# Patient Record
Sex: FEMALE | Race: WHITE
[De-identification: names, ages, dates, MRNs, and addresses within clinical notes are randomized per-mention and may not be internally consistent; named-entity substitution may affect disease eponyms.]

---

## 2021-04-05 ENCOUNTER — HOSPITAL ENCOUNTER (INPATIENT)
Dept: HOSPITAL 47 - EC | Age: 26
LOS: 1 days | Discharge: HOME | DRG: 176 | End: 2021-04-06
Attending: INTERNAL MEDICINE | Admitting: INTERNAL MEDICINE
Payer: COMMERCIAL

## 2021-04-05 VITALS — RESPIRATION RATE: 16 BRPM

## 2021-04-05 DIAGNOSIS — I26.99: Primary | ICD-10-CM

## 2021-04-05 DIAGNOSIS — Z20.822: ICD-10-CM

## 2021-04-05 DIAGNOSIS — Z87.42: ICD-10-CM

## 2021-04-05 DIAGNOSIS — Z98.890: ICD-10-CM

## 2021-04-05 DIAGNOSIS — Z79.890: ICD-10-CM

## 2021-04-05 DIAGNOSIS — E87.2: ICD-10-CM

## 2021-04-05 DIAGNOSIS — E03.9: ICD-10-CM

## 2021-04-05 LAB
ALBUMIN SERPL-MCNC: 4.6 G/DL (ref 3.5–5)
ALP SERPL-CCNC: 80 U/L (ref 38–126)
ALT SERPL-CCNC: 21 U/L (ref 4–34)
ANION GAP SERPL CALC-SCNC: 12 MMOL/L
APAP SPEC-MCNC: <10 UG/ML
APTT BLD: 23.4 SEC (ref 22–30)
AST SERPL-CCNC: 24 U/L (ref 14–36)
BASOPHILS # BLD AUTO: 0 K/UL (ref 0–0.2)
BASOPHILS NFR BLD AUTO: 0 %
BUN SERPL-SCNC: 10 MG/DL (ref 7–17)
CALCIUM SPEC-MCNC: 9.6 MG/DL (ref 8.4–10.2)
CHLORIDE SERPL-SCNC: 107 MMOL/L (ref 98–107)
CK SERPL-CCNC: 55 U/L (ref 30–135)
CO2 SERPL-SCNC: 20 MMOL/L (ref 22–30)
D DIMER PPP FEU-MCNC: 0.43 MG/L FEU (ref ?–0.6)
EOSINOPHIL # BLD AUTO: 0.1 K/UL (ref 0–0.7)
EOSINOPHIL NFR BLD AUTO: 1 %
ERYTHROCYTE [DISTWIDTH] IN BLOOD BY AUTOMATED COUNT: 4.32 M/UL (ref 3.8–5.4)
ERYTHROCYTE [DISTWIDTH] IN BLOOD: 11.7 % (ref 11.5–15.5)
GLUCOSE BLD-MCNC: 91 MG/DL (ref 75–99)
GLUCOSE SERPL-MCNC: 101 MG/DL (ref 74–99)
HCG SERPL-MCNC: <2.4 MIU/ML
HCO3 BLDV-SCNC: 23 MMOL/L (ref 24–28)
HCT VFR BLD AUTO: 37 % (ref 34–46)
HGB BLD-MCNC: 13 GM/DL (ref 11.4–16)
INR PPP: 1 (ref ?–1.2)
KETONES UR QL STRIP.AUTO: (no result)
LIPASE SERPL-CCNC: 110 U/L (ref 23–300)
LYMPHOCYTES # SPEC AUTO: 1.9 K/UL (ref 1–4.8)
LYMPHOCYTES NFR SPEC AUTO: 21 %
MAGNESIUM SPEC-SCNC: 1.5 MG/DL (ref 1.6–2.3)
MCH RBC QN AUTO: 30.2 PG (ref 25–35)
MCHC RBC AUTO-ENTMCNC: 35.3 G/DL (ref 31–37)
MCV RBC AUTO: 85.7 FL (ref 80–100)
MONOCYTES # BLD AUTO: 0.5 K/UL (ref 0–1)
MONOCYTES NFR BLD AUTO: 6 %
NEUTROPHILS # BLD AUTO: 6.2 K/UL (ref 1.3–7.7)
NEUTROPHILS NFR BLD AUTO: 71 %
PCO2 BLDV: 38 MMHG (ref 37–51)
PH BLDV: 7.39 [PH] (ref 7.31–7.41)
PH UR: 7.5 [PH] (ref 5–8)
PLATELET # BLD AUTO: 196 K/UL (ref 150–450)
POTASSIUM SERPL-SCNC: 4.1 MMOL/L (ref 3.5–5.1)
PROT SERPL-MCNC: 7.5 G/DL (ref 6.3–8.2)
PT BLD: 10.9 SEC (ref 9–12)
SODIUM SERPL-SCNC: 139 MMOL/L (ref 137–145)
SP GR UR: 1 (ref 1–1.03)
UROBILINOGEN UR QL STRIP: <2 MG/DL (ref ?–2)
WBC # BLD AUTO: 8.7 K/UL (ref 3.8–10.6)

## 2021-04-05 PROCEDURE — 85379 FIBRIN DEGRADATION QUANT: CPT

## 2021-04-05 PROCEDURE — 84443 ASSAY THYROID STIM HORMONE: CPT

## 2021-04-05 PROCEDURE — 82550 ASSAY OF CK (CPK): CPT

## 2021-04-05 PROCEDURE — 84702 CHORIONIC GONADOTROPIN TEST: CPT

## 2021-04-05 PROCEDURE — 86901 BLOOD TYPING SEROLOGIC RH(D): CPT

## 2021-04-05 PROCEDURE — 95816 EEG AWAKE AND DROWSY: CPT

## 2021-04-05 PROCEDURE — 83880 ASSAY OF NATRIURETIC PEPTIDE: CPT

## 2021-04-05 PROCEDURE — 82803 BLOOD GASES ANY COMBINATION: CPT

## 2021-04-05 PROCEDURE — 80306 DRUG TEST PRSMV INSTRMNT: CPT

## 2021-04-05 PROCEDURE — 85610 PROTHROMBIN TIME: CPT

## 2021-04-05 PROCEDURE — 70450 CT HEAD/BRAIN W/O DYE: CPT

## 2021-04-05 PROCEDURE — 80320 DRUG SCREEN QUANTALCOHOLS: CPT

## 2021-04-05 PROCEDURE — 83735 ASSAY OF MAGNESIUM: CPT

## 2021-04-05 PROCEDURE — 87040 BLOOD CULTURE FOR BACTERIA: CPT

## 2021-04-05 PROCEDURE — 84484 ASSAY OF TROPONIN QUANT: CPT

## 2021-04-05 PROCEDURE — 86900 BLOOD TYPING SEROLOGIC ABO: CPT

## 2021-04-05 PROCEDURE — 71275 CT ANGIOGRAPHY CHEST: CPT

## 2021-04-05 PROCEDURE — 84100 ASSAY OF PHOSPHORUS: CPT

## 2021-04-05 PROCEDURE — 99285 EMERGENCY DEPT VISIT HI MDM: CPT

## 2021-04-05 PROCEDURE — 83690 ASSAY OF LIPASE: CPT

## 2021-04-05 PROCEDURE — 85027 COMPLETE CBC AUTOMATED: CPT

## 2021-04-05 PROCEDURE — 93306 TTE W/DOPPLER COMPLETE: CPT

## 2021-04-05 PROCEDURE — 81003 URINALYSIS AUTO W/O SCOPE: CPT

## 2021-04-05 PROCEDURE — 85025 COMPLETE CBC W/AUTO DIFF WBC: CPT

## 2021-04-05 PROCEDURE — 80143 DRUG ASSAY ACETAMINOPHEN: CPT

## 2021-04-05 PROCEDURE — 80048 BASIC METABOLIC PNL TOTAL CA: CPT

## 2021-04-05 PROCEDURE — 93970 EXTREMITY STUDY: CPT

## 2021-04-05 PROCEDURE — 85730 THROMBOPLASTIN TIME PARTIAL: CPT

## 2021-04-05 PROCEDURE — 80053 COMPREHEN METABOLIC PANEL: CPT

## 2021-04-05 PROCEDURE — 71045 X-RAY EXAM CHEST 1 VIEW: CPT

## 2021-04-05 PROCEDURE — 96374 THER/PROPH/DIAG INJ IV PUSH: CPT

## 2021-04-05 PROCEDURE — 86140 C-REACTIVE PROTEIN: CPT

## 2021-04-05 PROCEDURE — 83605 ASSAY OF LACTIC ACID: CPT

## 2021-04-05 PROCEDURE — 36415 COLL VENOUS BLD VENIPUNCTURE: CPT

## 2021-04-05 PROCEDURE — 87635 SARS-COV-2 COVID-19 AMP PRB: CPT

## 2021-04-05 PROCEDURE — 86850 RBC ANTIBODY SCREEN: CPT

## 2021-04-05 PROCEDURE — 93005 ELECTROCARDIOGRAM TRACING: CPT

## 2021-04-05 RX ADMIN — CEFAZOLIN SCH MLS/HR: 330 INJECTION, POWDER, FOR SOLUTION INTRAMUSCULAR; INTRAVENOUS at 12:50

## 2021-04-05 RX ADMIN — MAGNESIUM SULFATE IN DEXTROSE SCH MLS/HR: 10 INJECTION, SOLUTION INTRAVENOUS at 12:41

## 2021-04-05 RX ADMIN — MAGNESIUM SULFATE IN DEXTROSE SCH MLS/HR: 10 INJECTION, SOLUTION INTRAVENOUS at 14:31

## 2021-04-05 RX ADMIN — HEPARIN SODIUM SCH MLS/HR: 10000 INJECTION, SOLUTION INTRAVENOUS at 12:48

## 2021-04-05 NOTE — CT
EXAMINATION TYPE: CT angio chest

 

DATE OF EXAM: 4/5/2021

 

COMPARISON: None

 

HISTORY: Tachycardia, chest pain. Recent surgical intervention.

 

CT DLP: 332.1 mGycm

 

CONTRAST: 

CT chest with contrast and 3D reconstruction with MIP imaging is performed with IV Contrast, patient 
injected with 100 mL of Isovue 370.

 

Contrast-enhanced CT of the chest was performed through the course of the pulmonary arteries with radha
g and mediastinal window settings submitted.  3D reconstruction with MIP imaging was also performed.

 

PULMONARY ARTERIES: There are a few small scattered filling defects suggested for example second orde
r branch left lower lobe image 65 of 126 as well as a third order branch left lower lobe image 72 of 
126. Pulmonary embolism is not excluded. There is no evidence for large saddle component.

 

LUNGS: The lungs are clear and free of infiltrate. Mild dependent basilar atelectasis. No pulmonary n
odule or mass is detected.  No pleural effusion.  

 

MEDIASTINUM:  Thoracic aorta is of normal caliber,however, evaluation is limited given timing of the 
contrast bolus.  If there is concern for thoracic aortic pathology consider ESSIE.  Correlate clinicall
y . The heart is not enlarged.  No evidence for mediastinal mass.  No mediastinal lymph nodes greater
 than 1cm.

 

HILAR STRUCTURES: No evidence for mass.  No hilar lymph nodes greater than 1 cm.

 

UPPER ABDOMEN: There is free air within the upper abdomen. Correlate with the recent surgical interve
ntion.

 

IMPRESSION:

1.  There are a few tiny filling defects suggested and I cannot exclude underlying pulmonary embolism
. As noted there is no evidence for a large central component or saddle embolus.

2. Free air within the upper abdomen. Correlate with the patient's surgical history.

## 2021-04-05 NOTE — P.HPIM
History of Present Illness


H&P Date: 04/05/21


Chief Complaint: syncope


Patient is a 26-year-old female with a history of hyperthyroidism status post 

radioactive iodine therapy requiring thyroid replacement currently, rob shepard who presented to the ER via EMS secondary to episodes of decreased 

responsiveness.  In the ER she underwent an extensive evaluation.  Vital signs 

on arrival demonstrated tachycardia with a heart rate of 124.  Laboratory 

analysis showed a lactic acid of 2.8.  CT of the chest showed probable left lobe

pulmonary emboli without saddle PE.  She was started on a heparin drip and 

arrangements were made for admission.





Per EMS report on arrival patient was able to move the lower half her body that 

had feeling along with tremors.  She went unresponsive lasting 30-45 seconds and

then woke up and was alert and oriented.





Patient seen and examined at bedside in the ER.  She reports that approximately 

one week ago she underwent exploratory laparoscopy for possible endometriosis.  

She was having an unremarkable postoperative course until last evening.  She 

began having some abdominal cramping and felt nauseated.  Overnight she 

experienced some palpitations and difficulty catching her breath when laying 

flat.  This morning she got up to go to work and felt dizzy and nauseated.  She 

then  started having left-sided chest pain without radiation.  She then had an 

episode of decreased responsiveness.  She reports that she had an episode where 

she could hear everything and see everything was unable to respond associated 

with some cramping and tingling in her right arm.  She denies any loss of bowel 

or bladder.  She denies any tongue biting.  She reports some night sweats but no

recent fever or chills.  No coughing.





She has no history of seizure disorder or heart problems.  She has no personal 

or family history of blood clotting disorder.  She is feeling better at this 

point in time.





Hx of hyperthyroidism and was on cardiac medications for 6 months, then 

hyperthyroid meds, and finaly required radioactive iodine X 2 and not on thyroid

replacement therapy. 





Pertinent positives and negatives as discussed in HPI, a complete review of 

systems was performed and all other systems are negative.





General: non toxic, no distress, appears at stated age


Derm:  warm, dry


Head: atraumatic, normocephalic, symmetric


Eyes: EOMI, no lid lag, anicteric sclera, pupils equal round reactive to light


ENT: Nose and ears atraumatic, no thrush,  no pharyngeal erythema


Neck: No thyromegaly, no cervical lymphadenopathy, trachea midline, supple


Mouth: no lip lesion, mucus membranes moist


Cardiovascular: S1S2 reg, no murmur, positive posterior tibial pulse bilateral, 

no edema, capillary refill less than 2 seconds


Lungs: clear to ascultation bilateral, no ronchi, no rales, no wheeze, no 

accessory muscle use


Abdominal: soft,  nontender to palpation, no guarding, no appreciable 

organomegaly, normal bowel sounds


Ext: no gross muscle atrophy,  muscle strength muscle strength 5 out of 5 in all

4 extremities,  no contractures


Neuro:  CN II-XI grossly intact, light touch intact all 4 extremities, finger to

nose within normal limits,


Psych: Alert, oriented, appropriate affect





Probable small pulmonary embolism 


- heparin gtt transition to eliquis in AM


- wean O2 as able





Multiple episodes of decreased responsiveness


- seizure vs syncope


- echo


- tele


- orthostatic vital signs


- consult cardio


- consult neurology


- seizure precautions


- EEG 





Hypothyroidism with hx of hyperthyroidism 


- check TSH


- levothyroxine 





The patient is admitted with an anticipated greater than 2 midnight stay for 

evaluation of syncope and pulmonary embolism.


Surrogate decision-maker: 


DVT prophylaxis: heparin gtt for PE


Discussed with: Patient, ed physician 


Anticipated discharge date: 1-2 days


Anticipated discharge place: home


A total of 65 minutes was spent on the care of this complex patient more than 

50% of the time was spent in counseling and care coordination.








Past Medical History


Past Medical History: Thyroid Disorder


History of Any Multi-Drug Resistant Organisms: None Reported


Additional Past Surgical History / Comment(s): Exploratory laparoscopy for 

endometriosis, radioactive iodine to thyroid 2


Past Psychological History: No Psychological Hx Reported


Smoking Status: Never smoker


Past Alcohol Use History: None Reported


Past Drug Use History: None Reported





- Past Family History


  ** Family history


Additional Family Medical History / Comment(s): Denies family history of blood 

clots, coronary artery disease, valvular heart disease, stroke





Medications and Allergies


                                Home Medications











 Medication  Instructions  Recorded  Confirmed  Type


 


Docusate [Colace] 100 mg PO DAILY PRN 04/05/21 04/05/21 History


 


Ibuprofen [Motrin] 600 mg PO Q8HR PRN 04/05/21 04/05/21 History


 


Levothyroxine Sodium [Synthroid] 175 mcg PO DAILY 04/05/21 04/05/21 History


 


Norethindrone [Kathy] 0.35 mg PO HS 04/05/21 04/05/21 History








                                    Allergies











Allergy/AdvReac Type Severity Reaction Status Date / Time


 


No Known Allergies Allergy   Verified 04/05/21 11:06














Physical Exam


Osteopathic Statement: *.  No significant issues noted on an osteopathic 

structural exam other than those noted in the History and Physical/Consult.


Vitals: 


                                   Vital Signs











  Temp Pulse Resp BP Pulse Ox


 


 04/05/21 12:19   80  18  114/87  98


 


 04/05/21 10:56   88  18  120/83  100


 


 04/05/21 10:10  98.9 F  124 H  16  128/82  100








                                Intake and Output











 04/04/21 04/05/21 04/05/21





 22:59 06:59 14:59


 


Other:   


 


  Weight   72.575 kg














Results


CBC & Chem 7: 


                                 04/05/21 10:23





                                 04/05/21 10:23


Labs: 


                  Abnormal Lab Results - Last 24 Hours (Table)











  04/05/21 04/05/21 04/05/21 Range/Units





  10:23 10:23 10:55 


 


VBG HCO3     (24-28)  mmol/L


 


Carbon Dioxide  20 L    (22-30)  mmol/L


 


Glucose  101 H    (74-99)  mg/dL


 


Plasma Lactic Acid Juan   2.8 H*   (0.7-2.0)  mmol/L


 


Magnesium  1.5 L    (1.6-2.3)  mg/dL


 


TSH  0.058 L    (0.465-4.680)  mIU/L


 


Urine Ketones    1+ H  (Negative)  














  04/05/21 Range/Units





  10:55 


 


VBG HCO3  23 L  (24-28)  mmol/L


 


Carbon Dioxide   (22-30)  mmol/L


 


Glucose   (74-99)  mg/dL


 


Plasma Lactic Acid Juan   (0.7-2.0)  mmol/L


 


Magnesium   (1.6-2.3)  mg/dL


 


TSH   (0.465-4.680)  mIU/L


 


Urine Ketones   (Negative)

## 2021-04-05 NOTE — CT
EXAMINATION TYPE: CT brain wo con

 

DATE OF EXAM: 4/5/2021

 

COMPARISON: None

 

HISTORY: seizure, syncope 

 

Unenhanced CT of the brain was performed.  

 

The ventricles, basal cisterns and sulci overlying the cerebral convexities demonstrate a normal appe
arance.  

 

There is no evidence for intracranial hemorrhage or sulcal effacement.  

 

No mass effects are seen.  

 

Osseous calvarium is intact.

 

If symptoms persist consider MRI as clinically warranted.  

 

IMPRESSION:

 

1.  No acute intracranial process is seen at this time.

## 2021-04-05 NOTE — ED
General Adult HPI





- General


Chief complaint: Recheck/Abnormal Lab/Rx


Stated complaint: altered mental status


Source: patient, EMS


Mode of arrival: EMS


Limitations: altered mental status





- History of Present Illness


Initial comments: 


Dictation was produced using dragon dictation software. please excuse any 

grammatical, word or spelling errors. 





This patient was cared for during a federal and state declared state of 

emergency secondary to Covid 19





Chief Complaint: 26-year-old female presents with multiple episodes of syncope





History of Present Illness: 26-year-old female she has past medical history of 

thyroid disease.  She had radiation to the thyroid several years ago as a now on

thyroid supplementation.  Approximately 10 days ago patient had robotic e

xpiratory laparoscopy for concerns of possible endometriosis.  Patient reports 

that there was no significant findings that were seen on the laparoscopy.  No 

other medical history.  Today she has been feeling unwell.  While en route to 

the emergency department patient was having frequent and multiple episodes of 

unresponsiveness.  It was described that her eyes would become cross eyed and 

should be unresponsive to any audio or tactile stimuli for several seconds.  

Patient states she's been having palpitations.








The ROS documented in this emergency department record has been reviewed and 

confirmed by me.  Those systems with pertinent positive or negative responses 

have been documented in the HPI.  All other systems are other negative and/or 

noncontributory.








PHYSICAL EXAM:


General Impression: Alert and oriented x3, tremulous, cool extremities, 

prolonged capillary refill, weak and thready pulses


HEENT: Normocephalic atraumatic, extra-ocular movements intact, pupils equal and

reactive to light bilaterally, dry mucous membranes


Cardiovascular: Tachycardic


Chest: Able to complete full sentences, no retractions, no tachypnea


Abdomen: abdomen soft, non-tender, non-distended, no organomegaly


Musculoskeletal: Weak Pulses but present present and equal in all extremities, 

no peripheral edema


Motor:  no focal deficits noted


Neurological: CN II-XII grossly intact, no focal motor or sensory deficits noted


Skin: Pale








ED course: 26-year-old female with past medical history of hypothyroidism 

presents to the emergency Department with generalized malaise, feeling unwell 

and episodes of syncope.  Vital signs upon arrival shows heart rate of 124, rest

of vital signs within acceptable limits.  While at the bedside being evaluated 

and obtaining history of present illness she would have multiple episodes where 

she would become unresponsive.  She does have some shaking episodes.  At this p

oint the differential is wide.  She was placed on cardiac monitor during one of 

these syncopal episodes.  There is no observance of any ventricular tachycardia 

or ventricular fibrillation or other dysrhythmia that would suggest cardiogenic 

syncope.  She is however tachycardic.  EKG shows sinus tachycardia.  Patient was

given Narcan sugar is prescribed tramadol recently.  Given the history of recent

surgery there is concern of pulmonary embolism.  Although, she is not hypoxic or

complaining of any lower extremity symptoms.  During these episodes of syncope 

she does not have any post ictal state however she was given 2 mg of Ativan to 

see if there was any sort of improvement.  Point-of-care blood glucose was 

normal.  Patient takes oral contraceptives, thyroid medications and analgesics 

from the recent surgery.





ED skin abrasions no acute processes.  CT of the chest shows multiple small danya

ling defects consistent with pulmonary emboli.  There is no evidence of large 

central component.  There is air within the upper abdomen consistent with recent

surgery.





EKG interpretation: Ventricular rate 114, sinus tachycardia, MT interval 140, 

QRS 80, . No MT prolongation, no QTC prolongation





Repeat EKG was performed at 11:38 AM.  Ventricular rate 91, normal sinus 

rhythm,.  Interval 140, QRS 80, QTc 455





Patient reevaluated at bedside at approximately 11:50 AM.  She is significantly 

improved after intravenous fluid administration.  Patient is reevaluated at 

12:30 PM.  She even appears much more improved.  Laboratory evaluation obtained 

CBC is unremarkable.  Coag panel is negative.  D-dimer 0.43.  Venous blood gas 

shows bicarb of 23 but normal venous blood gas pH.  Metabolic panel shows bicarb

of 20 with a gap of 12.  She does have a lactic acidosis 2.8.  Magnesium 1.5.  

TSH of 0.058.  Urinalysis shows 1+ ketones.  Urine drug screen is negative.  

Patient be admitted for medical monitoring.  She started heparin for PEs.  She 

is much better however given that she had multiple episodes of syncope upon 

initial arrival she benefit from further monitoring and IV fluids.








- Related Data


                                Home Medications











 Medication  Instructions  Recorded  Confirmed


 


Docusate [Colace] 100 mg PO DAILY PRN 04/05/21 04/05/21


 


Ibuprofen [Motrin] 600 mg PO Q8HR PRN 04/05/21 04/05/21


 


Levothyroxine Sodium [Synthroid] 175 mcg PO DAILY 04/05/21 04/05/21


 


Norethindrone [Kathy] 0.35 mg PO HS 04/05/21 04/05/21











                                    Allergies











Allergy/AdvReac Type Severity Reaction Status Date / Time


 


No Known Allergies Allergy   Verified 04/05/21 11:06














Review of Systems


ROS Statement: 


Those systems with pertinent positive or pertinent negative responses have been 

documented in the HPI.





ROS Other: All systems not noted in ROS Statement are negative.





Past Medical History


Past Medical History: Thyroid Disorder


History of Any Multi-Drug Resistant Organisms: None Reported


Past Surgical History: No Surgical Hx Reported


Past Psychological History: No Psychological Hx Reported


Smoking Status: Never smoker


Past Alcohol Use History: None Reported


Past Drug Use History: None Reported





General Exam


Limitations: altered mental status





Course


                                   Vital Signs











  04/05/21 04/05/21 04/05/21





  10:10 10:56 12:19


 


Temperature 98.9 F  


 


Pulse Rate 124 H 88 80


 


Respiratory 16 18 18





Rate   


 


Blood Pressure 128/82 120/83 114/87


 


O2 Sat by Pulse 100 100 98





Oximetry   














Medical Decision Making





- Lab Data


Result diagrams: 


                                 04/05/21 10:23





                                 04/05/21 10:23


                                   Lab Results











  04/05/21 04/05/21 04/05/21 Range/Units





  10:23 10:23 10:23 


 


WBC  8.7    (3.8-10.6)  k/uL


 


RBC  4.32    (3.80-5.40)  m/uL


 


Hgb  13.0    (11.4-16.0)  gm/dL


 


Hct  37.0    (34.0-46.0)  %


 


MCV  85.7    (80.0-100.0)  fL


 


MCH  30.2    (25.0-35.0)  pg


 


MCHC  35.3    (31.0-37.0)  g/dL


 


RDW  11.7    (11.5-15.5)  %


 


Plt Count  196    (150-450)  k/uL


 


MPV  9.0    


 


Neutrophils %  71    %


 


Lymphocytes %  21    %


 


Monocytes %  6    %


 


Eosinophils %  1    %


 


Basophils %  0    %


 


Neutrophils #  6.2    (1.3-7.7)  k/uL


 


Lymphocytes #  1.9    (1.0-4.8)  k/uL


 


Monocytes #  0.5    (0-1.0)  k/uL


 


Eosinophils #  0.1    (0-0.7)  k/uL


 


Basophils #  0.0    (0-0.2)  k/uL


 


PT   10.9   (9.0-12.0)  sec


 


INR   1.0   (<1.2)  


 


APTT   23.4   (22.0-30.0)  sec


 


D-Dimer   0.43   (<0.60)  mg/L FEU


 


VBG pH     (7.31-7.41)  


 


VBG pCO2     (37-51)  mmHg


 


VBG HCO3     (24-28)  mmol/L


 


Sodium    139  (137-145)  mmol/L


 


Potassium    4.1  (3.5-5.1)  mmol/L


 


Chloride    107  ()  mmol/L


 


Carbon Dioxide    20 L  (22-30)  mmol/L


 


Anion Gap    12  mmol/L


 


BUN    10  (7-17)  mg/dL


 


Creatinine    0.71  (0.52-1.04)  mg/dL


 


Est GFR (CKD-EPI)AfAm    >90  (>60 ml/min/1.73 sqM)  


 


Est GFR (CKD-EPI)NonAf    >90  (>60 ml/min/1.73 sqM)  


 


Glucose    101 H  (74-99)  mg/dL


 


POC Glucose (mg/dL)     (75-99)  mg/dL


 


POC Glu Operater ID     


 


Plasma Lactic Acid Juan     (0.7-2.0)  mmol/L


 


Calcium    9.6  (8.4-10.2)  mg/dL


 


Magnesium    1.5 L  (1.6-2.3)  mg/dL


 


Total Bilirubin    0.6  (0.2-1.3)  mg/dL


 


AST    24  (14-36)  U/L


 


ALT    21  (4-34)  U/L


 


Alkaline Phosphatase    80  ()  U/L


 


Creatine Kinase    55  ()  U/L


 


Troponin I     (0.000-0.034)  ng/mL


 


C-Reactive Protein    5.1  (<10.0)  mg/L


 


NT-Pro-B Natriuret Pep     pg/mL


 


Total Protein    7.5  (6.3-8.2)  g/dL


 


Albumin    4.6  (3.5-5.0)  g/dL


 


Lipase    110  ()  U/L


 


TSH    0.058 L  (0.465-4.680)  mIU/L


 


HCG, Quant    <2.4  mIU/mL


 


Urine Color     


 


Urine Appearance     (Clear)  


 


Urine pH     (5.0-8.0)  


 


Ur Specific Gravity     (1.001-1.035)  


 


Urine Protein     (Negative)  


 


Urine Glucose (UA)     (Negative)  


 


Urine Ketones     (Negative)  


 


Urine Blood     (Negative)  


 


Urine Nitrite     (Negative)  


 


Urine Bilirubin     (Negative)  


 


Urine Urobilinogen     (<2.0)  mg/dL


 


Ur Leukocyte Esterase     (Negative)  


 


Acetaminophen    <10.0  ug/mL


 


Serum Alcohol    <10  mg/dL


 


Blood Type     


 


Blood Type Recheck     


 


Bld Type Recheck Status     


 


Antibody Screen     


 


Spec Expiration Date     














  04/05/21 04/05/21 04/05/21 Range/Units





  10:23 10:23 10:23 


 


WBC     (3.8-10.6)  k/uL


 


RBC     (3.80-5.40)  m/uL


 


Hgb     (11.4-16.0)  gm/dL


 


Hct     (34.0-46.0)  %


 


MCV     (80.0-100.0)  fL


 


MCH     (25.0-35.0)  pg


 


MCHC     (31.0-37.0)  g/dL


 


RDW     (11.5-15.5)  %


 


Plt Count     (150-450)  k/uL


 


MPV     


 


Neutrophils %     %


 


Lymphocytes %     %


 


Monocytes %     %


 


Eosinophils %     %


 


Basophils %     %


 


Neutrophils #     (1.3-7.7)  k/uL


 


Lymphocytes #     (1.0-4.8)  k/uL


 


Monocytes #     (0-1.0)  k/uL


 


Eosinophils #     (0-0.7)  k/uL


 


Basophils #     (0-0.2)  k/uL


 


PT     (9.0-12.0)  sec


 


INR     (<1.2)  


 


APTT     (22.0-30.0)  sec


 


D-Dimer     (<0.60)  mg/L FEU


 


VBG pH     (7.31-7.41)  


 


VBG pCO2     (37-51)  mmHg


 


VBG HCO3     (24-28)  mmol/L


 


Sodium     (137-145)  mmol/L


 


Potassium     (3.5-5.1)  mmol/L


 


Chloride     ()  mmol/L


 


Carbon Dioxide     (22-30)  mmol/L


 


Anion Gap     mmol/L


 


BUN     (7-17)  mg/dL


 


Creatinine     (0.52-1.04)  mg/dL


 


Est GFR (CKD-EPI)AfAm     (>60 ml/min/1.73 sqM)  


 


Est GFR (CKD-EPI)NonAf     (>60 ml/min/1.73 sqM)  


 


Glucose     (74-99)  mg/dL


 


POC Glucose (mg/dL)     (75-99)  mg/dL


 


POC Glu Operater ID     


 


Plasma Lactic Acid Juan  2.8 H*    (0.7-2.0)  mmol/L


 


Calcium     (8.4-10.2)  mg/dL


 


Magnesium     (1.6-2.3)  mg/dL


 


Total Bilirubin     (0.2-1.3)  mg/dL


 


AST     (14-36)  U/L


 


ALT     (4-34)  U/L


 


Alkaline Phosphatase     ()  U/L


 


Creatine Kinase     ()  U/L


 


Troponin I   <0.012   (0.000-0.034)  ng/mL


 


C-Reactive Protein     (<10.0)  mg/L


 


NT-Pro-B Natriuret Pep    130  pg/mL


 


Total Protein     (6.3-8.2)  g/dL


 


Albumin     (3.5-5.0)  g/dL


 


Lipase     ()  U/L


 


TSH     (0.465-4.680)  mIU/L


 


HCG, Quant     mIU/mL


 


Urine Color     


 


Urine Appearance     (Clear)  


 


Urine pH     (5.0-8.0)  


 


Ur Specific Gravity     (1.001-1.035)  


 


Urine Protein     (Negative)  


 


Urine Glucose (UA)     (Negative)  


 


Urine Ketones     (Negative)  


 


Urine Blood     (Negative)  


 


Urine Nitrite     (Negative)  


 


Urine Bilirubin     (Negative)  


 


Urine Urobilinogen     (<2.0)  mg/dL


 


Ur Leukocyte Esterase     (Negative)  


 


Acetaminophen     ug/mL


 


Serum Alcohol     mg/dL


 


Blood Type     


 


Blood Type Recheck     


 


Bld Type Recheck Status     


 


Antibody Screen     


 


Spec Expiration Date     














  04/05/21 04/05/21 04/05/21 Range/Units





  10:55 10:55 10:59 


 


WBC     (3.8-10.6)  k/uL


 


RBC     (3.80-5.40)  m/uL


 


Hgb     (11.4-16.0)  gm/dL


 


Hct     (34.0-46.0)  %


 


MCV     (80.0-100.0)  fL


 


MCH     (25.0-35.0)  pg


 


MCHC     (31.0-37.0)  g/dL


 


RDW     (11.5-15.5)  %


 


Plt Count     (150-450)  k/uL


 


MPV     


 


Neutrophils %     %


 


Lymphocytes %     %


 


Monocytes %     %


 


Eosinophils %     %


 


Basophils %     %


 


Neutrophils #     (1.3-7.7)  k/uL


 


Lymphocytes #     (1.0-4.8)  k/uL


 


Monocytes #     (0-1.0)  k/uL


 


Eosinophils #     (0-0.7)  k/uL


 


Basophils #     (0-0.2)  k/uL


 


PT     (9.0-12.0)  sec


 


INR     (<1.2)  


 


APTT     (22.0-30.0)  sec


 


D-Dimer     (<0.60)  mg/L FEU


 


VBG pH   7.39   (7.31-7.41)  


 


VBG pCO2   38   (37-51)  mmHg


 


VBG HCO3   23 L   (24-28)  mmol/L


 


Sodium     (137-145)  mmol/L


 


Potassium     (3.5-5.1)  mmol/L


 


Chloride     ()  mmol/L


 


Carbon Dioxide     (22-30)  mmol/L


 


Anion Gap     mmol/L


 


BUN     (7-17)  mg/dL


 


Creatinine     (0.52-1.04)  mg/dL


 


Est GFR (CKD-EPI)AfAm     (>60 ml/min/1.73 sqM)  


 


Est GFR (CKD-EPI)NonAf     (>60 ml/min/1.73 sqM)  


 


Glucose     (74-99)  mg/dL


 


POC Glucose (mg/dL)     (75-99)  mg/dL


 


POC Glu Operater ID     


 


Plasma Lactic Acid Juan     (0.7-2.0)  mmol/L


 


Calcium     (8.4-10.2)  mg/dL


 


Magnesium     (1.6-2.3)  mg/dL


 


Total Bilirubin     (0.2-1.3)  mg/dL


 


AST     (14-36)  U/L


 


ALT     (4-34)  U/L


 


Alkaline Phosphatase     ()  U/L


 


Creatine Kinase     ()  U/L


 


Troponin I     (0.000-0.034)  ng/mL


 


C-Reactive Protein     (<10.0)  mg/L


 


NT-Pro-B Natriuret Pep     pg/mL


 


Total Protein     (6.3-8.2)  g/dL


 


Albumin     (3.5-5.0)  g/dL


 


Lipase     ()  U/L


 


TSH     (0.465-4.680)  mIU/L


 


HCG, Quant     mIU/mL


 


Urine Color  Colorless    


 


Urine Appearance  Clear    (Clear)  


 


Urine pH  7.5    (5.0-8.0)  


 


Ur Specific Gravity  1.004    (1.001-1.035)  


 


Urine Protein  Negative    (Negative)  


 


Urine Glucose (UA)  Negative    (Negative)  


 


Urine Ketones  1+ H    (Negative)  


 


Urine Blood  Negative    (Negative)  


 


Urine Nitrite  Negative    (Negative)  


 


Urine Bilirubin  Negative    (Negative)  


 


Urine Urobilinogen  <2.0    (<2.0)  mg/dL


 


Ur Leukocyte Esterase  Negative    (Negative)  


 


Acetaminophen     ug/mL


 


Serum Alcohol     mg/dL


 


Blood Type    A Positive  


 


Blood Type Recheck    No Previous Record  


 


Bld Type Recheck Status    CABO Indicated  


 


Antibody Screen    NEGATIVE  


 


Spec Expiration Date    04/08/2021 - 2359 04/05/21 Range/Units





  11:00 


 


WBC   (3.8-10.6)  k/uL


 


RBC   (3.80-5.40)  m/uL


 


Hgb   (11.4-16.0)  gm/dL


 


Hct   (34.0-46.0)  %


 


MCV   (80.0-100.0)  fL


 


MCH   (25.0-35.0)  pg


 


MCHC   (31.0-37.0)  g/dL


 


RDW   (11.5-15.5)  %


 


Plt Count   (150-450)  k/uL


 


MPV   


 


Neutrophils %   %


 


Lymphocytes %   %


 


Monocytes %   %


 


Eosinophils %   %


 


Basophils %   %


 


Neutrophils #   (1.3-7.7)  k/uL


 


Lymphocytes #   (1.0-4.8)  k/uL


 


Monocytes #   (0-1.0)  k/uL


 


Eosinophils #   (0-0.7)  k/uL


 


Basophils #   (0-0.2)  k/uL


 


PT   (9.0-12.0)  sec


 


INR   (<1.2)  


 


APTT   (22.0-30.0)  sec


 


D-Dimer   (<0.60)  mg/L FEU


 


VBG pH   (7.31-7.41)  


 


VBG pCO2   (37-51)  mmHg


 


VBG HCO3   (24-28)  mmol/L


 


Sodium   (137-145)  mmol/L


 


Potassium   (3.5-5.1)  mmol/L


 


Chloride   ()  mmol/L


 


Carbon Dioxide   (22-30)  mmol/L


 


Anion Gap   mmol/L


 


BUN   (7-17)  mg/dL


 


Creatinine   (0.52-1.04)  mg/dL


 


Est GFR (CKD-EPI)AfAm   (>60 ml/min/1.73 sqM)  


 


Est GFR (CKD-EPI)NonAf   (>60 ml/min/1.73 sqM)  


 


Glucose   (74-99)  mg/dL


 


POC Glucose (mg/dL)  91  (75-99)  mg/dL


 


POC Glu Operater Cynthia Rodriguez  


 


Plasma Lactic Acid Juan   (0.7-2.0)  mmol/L


 


Calcium   (8.4-10.2)  mg/dL


 


Magnesium   (1.6-2.3)  mg/dL


 


Total Bilirubin   (0.2-1.3)  mg/dL


 


AST   (14-36)  U/L


 


ALT   (4-34)  U/L


 


Alkaline Phosphatase   ()  U/L


 


Creatine Kinase   ()  U/L


 


Troponin I   (0.000-0.034)  ng/mL


 


C-Reactive Protein   (<10.0)  mg/L


 


NT-Pro-B Natriuret Pep   pg/mL


 


Total Protein   (6.3-8.2)  g/dL


 


Albumin   (3.5-5.0)  g/dL


 


Lipase   ()  U/L


 


TSH   (0.465-4.680)  mIU/L


 


HCG, Quant   mIU/mL


 


Urine Color   


 


Urine Appearance   (Clear)  


 


Urine pH   (5.0-8.0)  


 


Ur Specific Gravity   (1.001-1.035)  


 


Urine Protein   (Negative)  


 


Urine Glucose (UA)   (Negative)  


 


Urine Ketones   (Negative)  


 


Urine Blood   (Negative)  


 


Urine Nitrite   (Negative)  


 


Urine Bilirubin   (Negative)  


 


Urine Urobilinogen   (<2.0)  mg/dL


 


Ur Leukocyte Esterase   (Negative)  


 


Acetaminophen   ug/mL


 


Serum Alcohol   mg/dL


 


Blood Type   


 


Blood Type Recheck   


 


Bld Type Recheck Status   


 


Antibody Screen   


 


Spec Expiration Date   














Disposition


Clinical Impression: 


 Syncope





Disposition: ADMITTED AS IP TO THIS [facility]


Condition: Fair


Referrals: 


None,Stated [Primary Care Provider] - 1-2 days


Decision Time: 12:39

## 2021-04-05 NOTE — XR
EXAMINATION TYPE: XR chest 1V portable

 

DATE OF EXAM: 4/5/2021

 

COMPARISON: NONE

 

HISTORY: Chest pain

 

TECHNIQUE: Single frontal view of the chest is obtained.

 

FINDINGS:  

There is no focal air space opacity, pleural effusion, or pneumothorax seen.  

The cardiac silhouette size is within normal limits.   

The osseous structures are intact. There is free air presumably from the recent surgical intervention
. Correlate clinically.

 

IMPRESSION:  

1.  No acute process.

## 2021-04-05 NOTE — ECHOF
Referral Reason:syncope and pulmonary embolism



MEASUREMENTS

--------

HEIGHT: 177.8 cm

WEIGHT: 72.6 kg

BP: 

IVSd:   0.9 cm     (0.6 - 1.1)

LVIDd:   4.3 cm     (3.9 - 5.3)

LVPWd:   0.9 cm     (0.6 - 1.1)

EDV(Teich):   83 ml

IVSs:   1.7 cm

LVIDs:   2.8 cm

LVPWs:   1.3 cm

%IVS Thck:   95 %

ESV(Teich):   29 ml

EF(Teich):   65 %

%FS:   35 %

SV(Teich):   54 ml

IVC:   16.19 mm

LALs A4C:   4.1 cm

LAAs A4C:   10.8 cm

LAESV A-L A4C:   25 ml

LAESV MOD A4C:   21 ml

LALs A2C:   4.3 cm

LAAs A2C:   9.0 cm

LAESV A-L A2C:   16 ml

LAESV MOD A2C:   14 ml

LAESV(A-L):   20 ml

LAESV Index (A-L):   10.70 ml/m

Ao Diam:   3.2 cm     (2.0 - 3.7)

LA Diam:   1.9 cm     (2.7 - 3.8)

AV Cusp:   2.3 cm     (1.5 - 2.6)

EPSS:   1.3 cm

MV E Raymond:   0.97 m/s

MV DecT:   125 ms

MV Dec Vernon:   7.7 m/s

MV A Raymond:   0.57 m/s

MV E/A Ratio:   1.71 

MV PHT:   36 ms

MR Vmax:   1.08 m/s

MR maxP.64 mmHg

AV Vmax:   1.15 m/s

AV maxP.33 mmHg

TR Vmax:   1.72 m/s

TR maxP.89 mmHg

RAP:   5.00 mmHg

RVSP:   16.89 mmHg

MV EF SLOPE:   110.32 mm/s     (70 - 150)

MV EXCURSION:   21.43 mm     (> 18.000)







FINDINGS

--------

This was a technically good study.

The left ventricular size is normal.   Left ventricular wall thickness is normal.   Overall left vent
ricular systolic function is normal with, an EF between 55 - 60 %.   The diastolic filling pattern is
 normal for the age of the patient 9.69.

The right ventricle is normal in size.

The left atrial size is normal.    Normal LA  size by volume 22+/-6 ml/m2.

The right atrial size is normal.

The aortic valve is trileaflet and appears structurally normal.

The mitral valve is normal.   There is trace mitral regurgitation.

The tricuspid valve appears structurally normal.   Trace tricuspid regurgitation present.   Right indra
tricular systolic pressure is normal at < 35 mmHg.

There is no pulmonic regurgitation present.

The aortic root size is normal.

Normal inferior vena cava with normal inspiratory collapse consistent with estimated right atrial pre
ssure of  5 mmHg.

There is no pericardial effusion.



CONCLUSIONS

--------

1. The left ventricular size is normal.

2. Left ventricular wall thickness is normal.

3. Overall left ventricular systolic function is normal with, an EF between 55 - 60 %.

4. The diastolic filling pattern is normal for the age of the patient 9.69

5. There is trace mitral regurgitation.

6. Trace tricuspid regurgitation present.

7. There is no pericardial effusion.





SONOGRAPHER: Janis Garcia RDCS

## 2021-04-06 VITALS — HEART RATE: 64 BPM | SYSTOLIC BLOOD PRESSURE: 112 MMHG | TEMPERATURE: 96.2 F | DIASTOLIC BLOOD PRESSURE: 66 MMHG

## 2021-04-06 LAB
ANION GAP SERPL CALC-SCNC: 10 MMOL/L
BUN SERPL-SCNC: 8 MG/DL (ref 7–17)
CALCIUM SPEC-MCNC: 9.4 MG/DL (ref 8.4–10.2)
CHLORIDE SERPL-SCNC: 108 MMOL/L (ref 98–107)
CO2 SERPL-SCNC: 21 MMOL/L (ref 22–30)
ERYTHROCYTE [DISTWIDTH] IN BLOOD BY AUTOMATED COUNT: 4.4 M/UL (ref 3.8–5.4)
ERYTHROCYTE [DISTWIDTH] IN BLOOD: 11.8 % (ref 11.5–15.5)
GLUCOSE SERPL-MCNC: 89 MG/DL (ref 74–99)
HCT VFR BLD AUTO: 38.5 % (ref 34–46)
HGB BLD-MCNC: 13.7 GM/DL (ref 11.4–16)
MAGNESIUM SPEC-SCNC: 2 MG/DL (ref 1.6–2.3)
MCH RBC QN AUTO: 31.2 PG (ref 25–35)
MCHC RBC AUTO-ENTMCNC: 35.6 G/DL (ref 31–37)
MCV RBC AUTO: 87.6 FL (ref 80–100)
PLATELET # BLD AUTO: 226 K/UL (ref 150–450)
POTASSIUM SERPL-SCNC: 4.3 MMOL/L (ref 3.5–5.1)
SODIUM SERPL-SCNC: 139 MMOL/L (ref 137–145)
WBC # BLD AUTO: 4.3 K/UL (ref 3.8–10.6)

## 2021-04-06 RX ADMIN — CEFAZOLIN SCH: 330 INJECTION, POWDER, FOR SOLUTION INTRAMUSCULAR; INTRAVENOUS at 08:34

## 2021-04-06 RX ADMIN — HEPARIN SODIUM SCH MLS/HR: 10000 INJECTION, SOLUTION INTRAVENOUS at 12:12

## 2021-04-06 NOTE — US
EXAMINATION TYPE: US venous doppler duplex LE BI

 

DATE OF EXAM: 4/6/2021 12:03 PM

 

COMPARISON: CT 

 

CLINICAL HISTORY: new PE . Patient stated had laparoscopy 2 weeks ago.

 

SIDE PERFORMED: Bilateral  

 

TECHNIQUE:  The lower extremity deep venous system is examined utilizing real time linear array sonog
ector with graded compression, doppler sonography and color-flow sonography.

 

VESSELS IMAGED:

Common Femoral Vein

Deep Femoral Vein

Greater Saphenous Vein *

Femoral Vein

Popliteal Vein

Small Saphenous Vein *

Proximal Calf Veins

(* superficial vessels)

 

 

 

Right Leg:  Negative for DVT

 

Left Leg:  Rouleaux Effect is noted with echoes in left Popliteal Vein, however, this vein is patent 
and compresses. Left leg is, otherwise, negative for DVT.

 

 

 

IMPRESSION: No definite evidence of pulmonary embolism at this time.

## 2021-04-06 NOTE — P.CRDCN
History of Present Illness


History of present illness: 


Patient is a 26-year-old female with a history of hyperthyroidism status post 

radioactive iodine therapy requiring thyroid replacement currently and 

endometriosis. She does not follow with a cardiologist. She presented to the ER 

via EMS secondary to episodes of decreased responsiveness. We have been 

consulted for syncope evaluation. EMS report on arrival patient states that 

patient was able move the lower half her body that had feeling along with 

tremors. She went unresponsive lasting 30-45 seconds and then woke up and was 

alert and oriented.





Patient seen and examined at bedside. She reports that approximately week and a 

half ago she underwent exploratory laparoscopy for possible endometriosis. 

Sunday night, she began having some abdominal cramping and felt nauseated.  

Overnight she experienced some palpitations felt as if her heart was slow down 

and shortness of breath, felt as if she couldnt take a deep breath.   This 

morning she got up to go to work and felt dizzy, nauseated, and felt she might 

have diarrhea.  She then started having left-sided chest pain without radiation.

 She then had an episode of decreased responsiveness.  She reports that she had 

multiple episodes, about 10 per family, where she could hear everything and see 

everything was unable to respond associated with some cramping and tingling in 

her right arm. She states these episodes were brief. 





CT of the chest showed probable left lobe pulmonary emboli without saddle, she 

was started on a heparin drip. EKG reveals sinus tachycardia, no signficant ST-T

wave abnormalities. Second EKG with sinus rhythm, no significant ST-T wave 

abnormalities. Telemetry tracings sinus mechanism HR 50s-70s. 


Echocardiogram EF 55-60%, trace MR, trace TR. 


Laboratory data reviewed D-dimer 0.43, CBC unremarkable, Lactic acid 2.8, 

Magnesium 1.5, Sodium 139, K 4.1, sCr 0.71, Troponin negative x 1, , 

Urine tox negative, COVID-19 negative. 





She denies any loss of bowel or bladder.  She denies any tongue biting.  She 

reports some night sweats but no recent fever or chills. She denies alcohol or 

illicit drug use. She has no history of cardiac disease or hematologic 

disorders.  She has no personal or family history of cardiac disease.





REVIEW OF SYSTEMS


At the time of my exam:


CONSTITUTIONAL: Denies fever or chills.


CARDIOVASCULAR: +shortness of breath +palpitations Denies chest pain, orthopnea,

Denies PND 


RESPIRATORY: Denies cough. 


GASTROINTESTINAL:+diarrhea, +nausea Denies abdominal pain, Denies constipation 

or vomiting.


MUSCULOSKELETAL: Denies myalgias.


NEUROLOGIC: Denies numbness, tingling, headache or weakness.


ENDOCRINE: Denies fatigue, Denies weight change,  polydipsia or polyurina.


GENITOURINARY: Denies burning, hematuria or urgency with micturation.


HEMATOLOGIC: Denies history of anemia or bleeding. 





PHYSICAL EXAMINATION


Vital Signs: BP 94/52 HR 72, afebrile, maintaining oxygen saturations on room 

air 


CONSTITUTIONAL: No apparent distress. 


HEENT: Head is normocephalic. Pupils are equal, round. Sclerae anicteric. Mucous

membranes of the mouth are moist.  No JVD. No carotid bruit.


CHEST EXAMINATION: Lungs are clear to auscultation. No chest wall tenderness is 

noted on palpation or with deep breathing. 


HEART EXAMINATION: Regular rate and rhythm. S1, S2 heard. No murmurs, gallops or

rub.


ABDOMEN: Soft, nontender. Positive bowel sounds.


EXTREMITIES: 2+ peripheral pulses, mild bilateral lower extremity edema  and no 

calf tenderness.


SKIN: intact 


NEUROLOGIC EXAMINATION: Patient is awake, alert and oriented x3. 





ASSESSMENT


Syncope- unclear etiology


Pulmonary Embolism - possibly provoked due to recent procedure 


Hyperthyroidism





PLAN


-Obtained bilateral LE Dopplers which were negative for a DVT


-Patient is undergoing EEG today


-From cardiology perspective, patient is stable, no arrhythmia noted on 

telemetry, echo with normal EF 55-60%


-Patient can follow up with Dr. Ray in the outpatient office, and discuss 

further cardiac workup and possible holter monitor. 





Nurse Practitioner note has been reviewed, I agree with a documented findings 

and plan of care.  Patient was seen and examined.











Past Medical History


Past Medical History: Thyroid Disorder


History of Any Multi-Drug Resistant Organisms: None Reported


Past Surgical History: No Surgical Hx Reported


Additional Past Surgical History / Comment(s): Exploratory laparoscopy for 

endometriosis, radioactive iodine to thyroid 2


Past Psychological History: No Psychological Hx Reported


Smoking Status: Never smoker


Past Alcohol Use History: None Reported


Past Drug Use History: None Reported





- Past Family History


  ** Family history


Additional Family Medical History / Comment(s): Denies family history of blood 

clots, coronary artery disease, valvular heart disease, stroke





Medications and Allergies


                                Home Medications











 Medication  Instructions  Recorded  Confirmed  Type


 


Docusate [Colace] 100 mg PO DAILY PRN 04/05/21 04/05/21 History


 


Ibuprofen [Motrin] 600 mg PO Q8HR PRN 04/05/21 04/05/21 History


 


Levothyroxine Sodium [Synthroid] 175 mcg PO DAILY 04/05/21 04/05/21 History


 


Norethindrone [Kathy] 0.35 mg PO HS 04/05/21 04/05/21 History


 


Rivaroxaban [Xarelto Starter Pack] 0 mg PO AS DIRECTED 30 Days #1 pack 04/06/21 

Rx








                                    Allergies











Allergy/AdvReac Type Severity Reaction Status Date / Time


 


No Known Allergies Allergy   Verified 04/05/21 11:06














Physical Exam


Vitals: 


                                   Vital Signs











  Temp Pulse Pulse Resp BP BP Pulse Ox


 


 04/06/21 03:30  97.8 F   74  16   99/62  96


 


 04/06/21 02:12     16   


 


 04/06/21 01:40  98.0 F   64  16   122/67  97


 


 04/05/21 20:00   58 L   16  101/66   96


 


 04/05/21 14:00   60   16  104/74   96


 


 04/05/21 12:19   80   18  114/87   98


 


 04/05/21 10:56   88   18  120/83   100


 


 04/05/21 10:10  98.9 F  124 H   16  128/82   100








                                Intake and Output











 04/05/21 04/06/21 04/06/21





 22:59 06:59 14:59


 


Intake Total  185.291 


 


Balance  185.291 


 


Intake:   


 


  Intake, IV Titration  185.291 





  Amount   


 


    Heparin Sod,Pork in 0.45%  185.291 





    NaCl 25,000 unit In 0.45   





    % NaCl 1 250ml.bag @ 18   





    UNITS/KG/HR 13.064 mls/hr   





    IV .Q19H9M [facility] Rx#:   





    [id number]   


 


Other:   


 


  Voiding Method  Toilet 


 


  # Voids  2 


 


  Weight  71.7 kg 














Results





                                 04/05/21 10:23





                                 04/05/21 10:23


                                 Cardiac Enzymes











  04/05/21 04/05/21 Range/Units





  10:23 10:23 


 


AST  24   (14-36)  U/L


 


Troponin I   <0.012  (0.000-0.034)  ng/mL








                                   Coagulation











  04/05/21 04/06/21 Range/Units





  10:23 00:53 


 


PT  10.9   (9.0-12.0)  sec


 


APTT  23.4  120.0 H*  (22.0-30.0)  sec








                                       CBC











  04/05/21 Range/Units





  10:23 


 


WBC  8.7  (3.8-10.6)  k/uL


 


RBC  4.32  (3.80-5.40)  m/uL


 


Hgb  13.0  (11.4-16.0)  gm/dL


 


Hct  37.0  (34.0-46.0)  %


 


Plt Count  196  (150-450)  k/uL








                          Comprehensive Metabolic Panel











  04/05/21 Range/Units





  10:23 


 


Sodium  139  (137-145)  mmol/L


 


Potassium  4.1  (3.5-5.1)  mmol/L


 


Chloride  107  ()  mmol/L


 


Carbon Dioxide  20 L  (22-30)  mmol/L


 


BUN  10  (7-17)  mg/dL


 


Creatinine  0.71  (0.52-1.04)  mg/dL


 


Glucose  101 H  (74-99)  mg/dL


 


Calcium  9.6  (8.4-10.2)  mg/dL


 


AST  24  (14-36)  U/L


 


ALT  21  (4-34)  U/L


 


Alkaline Phosphatase  80  ()  U/L


 


Total Protein  7.5  (6.3-8.2)  g/dL


 


Albumin  4.6  (3.5-5.0)  g/dL








                               Current Medications











Generic Name Dose Route Start Last Admin





  Trade Name Freq  PRN Reason Stop Dose Admin


 


Acetaminophen  650 mg  04/05/21 12:36 





  Acetaminophen Tab 325 Mg Tab  PO  





  Q6HR PRN  





  Mild Pain or Fever > 100.5  


 


Hydrocodone Bitart/Acetaminophen  1 each  04/05/21 12:36 





  Hydrocodone/Apap 5-325mg 1 Each Tab  PO  





  Q4HR PRN  





  Moderate Pain  


 


Heparin Sodium (Porcine)  0 unit  04/05/21 11:07 





  Heparin Sodium 1,000 Un/Ml (10ml Vl)  IV  





  PER PROTOCOL PRN  





  Low PTT  





  Protocol  


 


Heparin Sodium/Sodium Chloride  250 mls @ 13.064 mls/hr  04/05/21 11:15  

04/06/21 04:30





  25,000 unit/ Sodium Chloride  IV   15 units/kg/hr





  .Q19H9M ABBIE   10.886 mls/hr





    Titration





  Protocol  





  18 UNITS/KG/HR  


 


Sodium Chloride  1,000 mls @ 90 mls/hr  04/05/21 12:45  04/05/21 12:50





  Saline 0.9%  IV   90 mls/hr





  .Q11H7M ABBIE   Administration


 


Levothyroxine Sodium  176 mcg  04/06/21 06:30  04/06/21 06:25





  Levothyroxine 88 Mcg Tab  PO   176 mcg





  DAILY@0630 ABBIE   Administration


 


Naloxone HCl  0.2 mg  04/05/21 12:36 





  Naloxone 0.4 Mg/Ml 1 Ml Vial  IV  





  Q2M PRN  





  Opioid Reversal  


 


Ondansetron HCl  4 mg  04/05/21 12:36 





  Ondansetron 4 Mg/2 Ml Vial  IVP  





  Q8HR PRN  





  Nausea And Vomiting  


 


Pantoprazole Sodium  40 mg  04/06/21 09:00 





  Pantoprazole 40 Mg/10 Ml Vial  IV  





  DAILY ABBIE  








                                Intake and Output











 04/05/21 04/06/21 04/06/21





 22:59 06:59 14:59


 


Intake Total  185.291 


 


Balance  185.291 


 


Intake:   


 


  Intake, IV Titration  185.291 





  Amount   


 


    Heparin Sod,Pork in 0.45%  185.291 





    NaCl 25,000 unit In 0.45   





    % NaCl 1 250ml.bag @ 18   





    UNITS/KG/HR 13.064 mls/hr   





    IV .Q19H9M [facility] Rx#:   





    [id number]   


 


Other:   


 


  Voiding Method  Toilet 


 


  # Voids  2 


 


  Weight  71.7 kg 








                                        





                                 04/05/21 10:23 





                                 04/05/21 10:23

## 2021-04-06 NOTE — EEG
ELECTROENCEPHALOGRAM REPORT



DATE OF SERVICE:

04/06/2021



PREAMBLE:

This is a 26-year-old female with multiple syncopal spells.  This study is performed to

evaluate for any epileptiform activity.



EEG FINDINGS:

This is a 21 channel routine EEG recording in a patient utilizing 10/20 international

system with referential and bipolar montages.  Background consists of well developed,

well regulated, moderate voltage activity in 9-10 hertz alpha.  Background is posterior

dominant and seems to be reactive to eye opening and closing.  The patient was drowsy

during most of the study with presence of some mixed alpha and theta activity.

Bitemporal slowing was noted, probably related to drowsiness.  Photic driving response

was seen with most of the flash frequencies.  Deeper stages of sleep were not seen.  No

focal or generalized epileptiform activity is seen.



IMPRESSION:

This is a normal EEG mostly during drowsiness.  No epileptiform activity was seen.





MMODL / IJN: [id number] / Job#: [id number]

## 2021-04-06 NOTE — P.DS
Providers


Date of admission: 


04/05/21 12:36





Expected date of discharge: 04/06/21


Attending physician: 


Fannie Álvarez DO





Consults: 





                                        





04/05/21 14:27


Consult Physician Routine 


   Consulting Provider: Jake Huang


   Consult Reason/Comments: syncope vs seizure with PE


   Do you want consulting provider notified?: Yes


Consult Physician Routine 


   Consulting Provider: Brandon Ray


   Consult Reason/Comments: syncope vs seizure with PE


   Do you want consulting provider notified?: Yes











Primary care physician: 


Stated None





Hospital Course: 


Discharge Diagnosis:


Left lower lobe pulmonary embolism


Syncope


Recent laparoscopic surgery





Hospital Course: 


Patient is a 26-year-old female with a history of hyperthyroidism status post 

radioactive iodine therapy requiring thyroid replacement currently, 

endometriosis who presented to the ER via EMS secondary to episodes of decreased

responsiveness.  In the ER she underwent an extensive evaluation.  Vital signs 

on arrival demonstrated tachycardia with a heart rate of 124.  Laboratory 

analysis showed a lactic acid of 2.8.  CT of the chest showed probable left lobe

pulmonary emboli without saddle PE.  She was started on a heparin drip and 

arrangements were made for admission.  She was seen by neurology and had a 

normal EEG.  She was seen by cardiology and cleared for discharge.  She will 

need to follow-up outpatient for possible tilt table testing versus continue 

Holter monitoring.  She is aware that she will need to be on Eliquis for 6 

months to one year.  She has asked for outpatient referral and we will set her 

up with Alicia Nickerson nurse practitioner.





 


Patient seen and examined at bedside.  No chest pain, shortness of breath, 

nausea, or vomiting feeling much better and back to baseline.





Vital signs reviewed and stable. 


General: non toxic, no distress, appears at stated age


Derm: warm, dry


Head: atraumatic, normocephalic, symmetric


Eyes: EOMI, no lid lag, anicteric sclera


Mouth: no lip lesion, mucus membranes moist


Cardiovascular: S1S2 reg, no murmur, positive posterior tibial pulse bilateral, 


Lungs: CTA bilateral, no rhonchi, no rales , no accessory muscle use


Abdominal: soft,  nontender to palpation, no guarding, no appreciable 

organomegaly


Ext: no gross muscle atrophy, no edema, no contractures


Neuro:  CN II-XI grossly intact, no focal neuro deficits


Psych: Alert, oriented, appropriate affect 








A total of 25 minutes of time were spent preparing this complex discharge 

summary .





Patient Condition at Discharge: Fair





Plan - Discharge Summary


New Discharge Prescriptions: 


New


   Rivaroxaban [Xarelto Starter Pack] 0 mg PO AS DIRECTED 30 Days #1 pack





Continue


   Levothyroxine Sodium [Synthroid] 175 mcg PO DAILY





Discontinued


   Docusate [Colace] 100 mg PO DAILY PRN


     PRN Reason: Constipation


   Norethindrone [Kathy] 0.35 mg PO HS


   Ibuprofen [Motrin] 600 mg PO Q8HR PRN


     PRN Reason: Pain


Discharge Medication List





Levothyroxine Sodium [Synthroid] 175 mcg PO DAILY 04/05/21 [History]


Rivaroxaban [Xarelto Starter Pack] 0 mg PO AS DIRECTED 30 Days #1 pack 04/06/21 

[Rx]








Follow up Appointment(s)/Referral(s): 


Brandon Ray MD [STAFF PHYSICIAN] - 2 Weeks


Alicia Santana NPC [REFERRING] - 1 Week


None,Stated [Primary Care Provider] - 1-2 days


Patient Instructions/Handouts:  Pulmonary Embolism (DC)


Activity/Diet/Wound Care/Special Instructions: 


Activity:


as tolerated 


No contact sports, high risk activities, or horse back riding


No Driving until cleared by cardiology (Dr. Ray)





Diet: 


regular





Special Instructions: 


Stay off all birth control/ hormone replacement therapy as it can increase the 

risk of blood clots. 


Discharge Disposition: HOME SELF-CARE

## 2021-04-07 NOTE — P.CNNES
History of Present Illness


Consult date: 04/06/21


Requesting physician: Fannie Álvarez


Reason for Consult: Syncope vs seizure with PE


History of Present Illness: 





Patient is a 26-year-old female came to the hospital yesterday at 10:01 AM by 

ambulance for syncope versus seizure.  Patient states that her symptoms started 

2 days ago on Sunday evening when she felt not well.  She felt nausea with 

stomach bothering her.  Next day on Monday, she woke up and had diarrhea, and 

felt will throw up.  This feeling lasted for 1-1/2 hours.  She went to her bed, 

felt her heart rate was dropping.  Her mouth was dry, felt metallic taste.  She 

called her mother-in-law, and right after she passed out and was out for about 

30 seconds.  When she came to, she was fully alert and awake.  Patient's 

mother-in-law called the ambulance and before the EMS arrived, she had another 

spell like this.  Patient and her  states that she had some more spells 

while she was in the ambulance and had about 12 such spells, in which she was in

and out of consciousness. Each time she woke up was completely alert and 

oriented.  No post ictal confusion.





According to EMS flow sheet when they arrived patient was alert and oriented 

answering all questions appropriately.  Patient has mentioned that she woke up 

dizzy and nauseated.  He felt her heart was pausing.  She blacked out.  She was 

unable to move lower half of the body but head feeling.  Patient has tremors.  

Patient was unresponsive again lasting approximately 30-45 seconds.  Patient 

woke up was alert and oriented.  Stroke scale was negative.  Patient started 

complaining of crushing chest pain.  No headache, neck pain and back pain, 

nausea or vomiting.  Patient was tachypneic.  Patient continued to have episodes

of in and out of consciousness.  Patient's vital signs at the scene was blood 

pressure 135/92, pulse rate 108, saturation 100% blood sugar 131 and temperature

97.8





Vital signs on arrival blood pressure 128/82, pulse rate 124, temperature 98.9. 

Most recent blood pressure is 94/52 with pulse of 72.  Patient is afebrile.  

Blood pressure was normal CBC, PT/PTT, sodium 139 potassium 4.1, normal renal 

functions.  Normal hepatic panel.  TSH 0.058 which is slightly low, UA negative,

urine drug screen negative.  Blood alcohol level negative.  Corona virus PCR 

negative.  CT head showed no acute intracranial process.  CTA of the chest 

showed few tiny filling defects suggested, cannot exclude underlying pulmonary 

embolism.  There is no evidence for a large central component or saddle embolus.

 Free air within the upper abdomen, correlate with the patient's surgical 

history.  Chest x-ray showed no acute process.  EKG with sinus tachycardia, ST 

and T-wave abnormality, consider lateral ischemia.  2-D echo showed normal left-

ventricular size.  Normal left-ventricular wall thickness.  EF is 55-60%.  Trace

MR.





Patient takes levothyroxine, norethindrone 0.35 mg at bedtime and ibuprofen. 

Patient had undergone laparoscopic procedure 2 weeks ago for endometriosis.  

Patient has been on birth control pills since age 17.  She denies any tobacco, 

alcohol or drug use.  No history of blood pressure diabetes.  No history of 

seizures.





Review of Systems





at present patient denies any focal symptoms, denies any headache, numbness 

tingling focal weakness.  Wants to go home.  All review of systems unremarkable.





Past Medical History


Past Medical History: Thyroid Disorder


History of Any Multi-Drug Resistant Organisms: None Reported


Past Surgical History: No Surgical Hx Reported


Additional Past Surgical History / Comment(s): Exploratory laparoscopy for 

endometriosis, radioactive iodine to thyroid 2


Past Psychological History: No Psychological Hx Reported


Smoking Status: Never smoker


Past Alcohol Use History: None Reported


Past Drug Use History: None Reported





- Past Family History


  ** Family history


Additional Family Medical History / Comment(s): Denies family history of blood 

clots, coronary artery disease, valvular heart disease, stroke





Medications and Allergies


                                Home Medications











 Medication  Instructions  Recorded  Confirmed  Type


 


Levothyroxine Sodium [Synthroid] 175 mcg PO DAILY 04/05/21 04/07/21 History


 


Rivaroxaban [Xarelto Starter Pack] See Taper PO AS DIRECTED 04/07/21 04/07/21 

History








                                    Allergies











Allergy/AdvReac Type Severity Reaction Status Date / Time


 


No Known Allergies Allergy   Verified 04/07/21 06:25














Physical Examination





- Vital Signs


Vital Signs: 


                                   Vital Signs











  Temp Pulse Pulse Resp BP BP Pulse Ox


 


 04/06/21 07:41  98.1 F   72  16   94/52  100


 


 04/06/21 03:30  97.8 F   74  16   99/62  96


 


 04/06/21 02:12     16   


 


 04/06/21 01:40  98.0 F   64  16   122/67  97


 


 04/05/21 20:00   58 L   16  101/66   96


 


 04/05/21 14:00   60   16  104/74   96


 


 04/05/21 12:19   80   18  114/87   98


 


 04/05/21 10:56   88   18  120/83   100


 


 04/05/21 10:10  98.9 F  124 H   16  128/82   100








                                Intake and Output











 04/05/21 04/06/21 04/06/21





 22:59 06:59 14:59


 


Intake Total  185.291 


 


Balance  185.291 


 


Intake:   


 


  Intake, IV Titration  185.291 





  Amount   


 


    Heparin Sod,Pork in 0.45%  185.291 





    NaCl 25,000 unit In 0.45   





    % NaCl 1 250ml.bag @ 18   





    UNITS/KG/HR 13.064 mls/hr   





    IV .Q19H9M [facility] Rx#:   





    [id number]   


 


Other:   


 


  Voiding Method  Toilet 


 


  # Voids  2 


 


  Weight  71.7 kg 














on examination patient is a young  female, in no acute distress.  Subhash young is alert, awake, oriented to time place and person.  Speech and language 

functions are normal.  Attention, concentration, fund of knowledge is adequate. 

On general examination there is no carotid bruit or murmur.  Abdomen is soft, 

nontender.  Peripheral pulses present.  On cranial examination pupils are round 

and reacting to light, visual fields are full on confrontation, extraocular 

muscles are intact with no nystagmus.  Face is symmetric, tongue protrudes to 

the midline.  Palatal elevation and sensation normal, hearing and shoulder shrug

normal, facial sensation is normal.  On muscle strength testing there is no 

pronator drift and the strength is normal in arms and legs distally and 

proximally.  Reflexes are 2+ and plantars downgoing.  Sensory to touch is equal.

 No ataxia for finger-to-nose testing, tone and bulk of muscles normal.  Gait 

normal.





Results





- Laboratory Findings


CBC and BMP: 


                                 04/06/21 12:50





                                 04/06/21 12:50


Abnormal Lab Findings: 


                                  Abnormal Labs











  04/05/21 04/05/21 04/05/21





  10:23 10:23 10:55


 


APTT   


 


VBG HCO3   


 


Carbon Dioxide  20 L  


 


Glucose  101 H  


 


Plasma Lactic Acid Juan   2.8 H* 


 


Magnesium  1.5 L  


 


TSH  0.058 L  


 


Urine Ketones    1+ H














  04/05/21 04/05/21 04/06/21





  10:55 14:06 00:53


 


APTT    120.0 H*


 


VBG HCO3  23 L  


 


Carbon Dioxide   


 


Glucose   


 


Plasma Lactic Acid Juan   0.6 L 


 


Magnesium   


 


TSH   


 


Urine Ketones   














Assessment and Plan


Assessment: 





* Syncopal spells, likely due to acute pulmonary embolism.


* Hypercoagulable state due to use of birth control pills.  Recent laparoscopic 

  procedure 2 weeks ago.


Plan: 





* Patient had an EEG performed today.  Patient was drowsy during most of the 

  study.  Some bitemporal slowing was seen, which could be related to drowsiness

  although underlying cortical neuronal dysfunction, or mild encephalopathy is a

  possibility.  No epileptiform activity is seen.


* Patient has been started on heparin for PE.  Patient will be maintained on 

  Xarelto.


* Telemetry monitoring so far showing sinus rhythm.


* Neurologically clear.

## 2021-12-10 ENCOUNTER — HOSPITAL ENCOUNTER (OUTPATIENT)
Dept: HOSPITAL 47 - EC | Age: 26
Setting detail: OBSERVATION
LOS: 1 days | Discharge: HOME | End: 2021-12-11
Attending: INTERNAL MEDICINE | Admitting: INTERNAL MEDICINE
Payer: COMMERCIAL

## 2021-12-10 DIAGNOSIS — Z79.899: ICD-10-CM

## 2021-12-10 DIAGNOSIS — G43.909: ICD-10-CM

## 2021-12-10 DIAGNOSIS — R20.0: ICD-10-CM

## 2021-12-10 DIAGNOSIS — N80.9: ICD-10-CM

## 2021-12-10 DIAGNOSIS — E03.9: ICD-10-CM

## 2021-12-10 DIAGNOSIS — Z86.711: ICD-10-CM

## 2021-12-10 DIAGNOSIS — R25.3: ICD-10-CM

## 2021-12-10 DIAGNOSIS — R25.8: ICD-10-CM

## 2021-12-10 DIAGNOSIS — R56.9: Primary | ICD-10-CM

## 2021-12-10 DIAGNOSIS — M54.2: ICD-10-CM

## 2021-12-10 DIAGNOSIS — Z79.890: ICD-10-CM

## 2021-12-10 LAB
ALBUMIN SERPL-MCNC: 5 G/DL (ref 3.5–5)
ALP SERPL-CCNC: 101 U/L (ref 38–126)
ALT SERPL-CCNC: 27 U/L (ref 4–34)
ANION GAP SERPL CALC-SCNC: 11 MMOL/L
AST SERPL-CCNC: 29 U/L (ref 14–36)
BASOPHILS # BLD AUTO: 0 K/UL (ref 0–0.2)
BASOPHILS NFR BLD AUTO: 0 %
BUN SERPL-SCNC: 13 MG/DL (ref 7–17)
CALCIUM SPEC-MCNC: 9.6 MG/DL (ref 8.4–10.2)
CHLORIDE SERPL-SCNC: 106 MMOL/L (ref 98–107)
CO2 SERPL-SCNC: 23 MMOL/L (ref 22–30)
EOSINOPHIL # BLD AUTO: 0.1 K/UL (ref 0–0.7)
EOSINOPHIL NFR BLD AUTO: 2 %
ERYTHROCYTE [DISTWIDTH] IN BLOOD BY AUTOMATED COUNT: 4.92 M/UL (ref 3.8–5.4)
ERYTHROCYTE [DISTWIDTH] IN BLOOD: 12.1 % (ref 11.5–15.5)
GLUCOSE SERPL-MCNC: 88 MG/DL (ref 74–99)
HCT VFR BLD AUTO: 44.4 % (ref 34–46)
HGB BLD-MCNC: 14.8 GM/DL (ref 11.4–16)
LYMPHOCYTES # SPEC AUTO: 2.4 K/UL (ref 1–4.8)
LYMPHOCYTES NFR SPEC AUTO: 37 %
MCH RBC QN AUTO: 30 PG (ref 25–35)
MCHC RBC AUTO-ENTMCNC: 33.3 G/DL (ref 31–37)
MCV RBC AUTO: 90.2 FL (ref 80–100)
MONOCYTES # BLD AUTO: 0.4 K/UL (ref 0–1)
MONOCYTES NFR BLD AUTO: 6 %
NEUTROPHILS # BLD AUTO: 3.3 K/UL (ref 1.3–7.7)
NEUTROPHILS NFR BLD AUTO: 53 %
PLATELET # BLD AUTO: 235 K/UL (ref 150–450)
POTASSIUM SERPL-SCNC: 4.4 MMOL/L (ref 3.5–5.1)
PROT SERPL-MCNC: 8.3 G/DL (ref 6.3–8.2)
SODIUM SERPL-SCNC: 140 MMOL/L (ref 137–145)
T4 FREE SERPL-MCNC: 2.31 NG/DL (ref 0.78–2.19)
WBC # BLD AUTO: 6.3 K/UL (ref 3.8–10.6)

## 2021-12-10 PROCEDURE — 84443 ASSAY THYROID STIM HORMONE: CPT

## 2021-12-10 PROCEDURE — 93005 ELECTROCARDIOGRAM TRACING: CPT

## 2021-12-10 PROCEDURE — 80306 DRUG TEST PRSMV INSTRMNT: CPT

## 2021-12-10 PROCEDURE — 84481 FREE ASSAY (FT-3): CPT

## 2021-12-10 PROCEDURE — 84439 ASSAY OF FREE THYROXINE: CPT

## 2021-12-10 PROCEDURE — 80053 COMPREHEN METABOLIC PANEL: CPT

## 2021-12-10 PROCEDURE — 83735 ASSAY OF MAGNESIUM: CPT

## 2021-12-10 PROCEDURE — 81025 URINE PREGNANCY TEST: CPT

## 2021-12-10 PROCEDURE — 85025 COMPLETE CBC W/AUTO DIFF WBC: CPT

## 2021-12-10 PROCEDURE — 80320 DRUG SCREEN QUANTALCOHOLS: CPT

## 2021-12-10 PROCEDURE — 36415 COLL VENOUS BLD VENIPUNCTURE: CPT

## 2021-12-10 PROCEDURE — 96376 TX/PRO/DX INJ SAME DRUG ADON: CPT

## 2021-12-10 PROCEDURE — 96375 TX/PRO/DX INJ NEW DRUG ADDON: CPT

## 2021-12-10 PROCEDURE — 96374 THER/PROPH/DIAG INJ IV PUSH: CPT

## 2021-12-10 PROCEDURE — 99285 EMERGENCY DEPT VISIT HI MDM: CPT

## 2021-12-10 PROCEDURE — 87635 SARS-COV-2 COVID-19 AMP PRB: CPT

## 2021-12-10 NOTE — ED
General Adult HPI





- General


Source: patient, family, EMS, old records reviewed


Mode of arrival: EMS


Limitations: no limitations





<Andrew Palomo - Last Filed: 12/10/21 15:02>





<Andrea Smith - Last Filed: 12/10/21 17:24>





- General


Chief complaint: Seizure


Stated complaint: Seizure


Time Seen by Provider: 12/10/21 14:09





- History of Present Illness


Initial comments: 





Patient is a pleasant 26-year-old female presenting to the emergency Department 

with reported seizure.  Seizures have been intermittent.  Patient has had 

reported seizures over the past several months and has seen neurologists and has

been diagnosed with non-epileptic seizures.  Patient is awake and alert at this 

time however is having some mild shaking and not answering questions verbally at

this time.  Patient will shake her head yes and no. (Andrew Palomo)





- Related Data


                                Home Medications











 Medication  Instructions  Recorded  Confirmed


 


Levothyroxine Sodium [Synthroid] 175 mcg PO DAILY 04/05/21 12/10/21


 


Escitalopram [Lexapro] 10 mg PO HS 12/10/21 12/10/21











                                    Allergies











Allergy/AdvReac Type Severity Reaction Status Date / Time


 


No Known Allergies Allergy   Verified 12/10/21 14:32














Review of Systems


ROS Other: All systems not noted in ROS Statement are negative.


Constitutional: Denies: fever


Eyes: Denies: eye pain


ENT: Denies: ear pain


Respiratory: Denies: cough


Cardiovascular: Denies: chest pain


Endocrine: Denies: fatigue


Gastrointestinal: Denies: vomiting


Genitourinary: Denies: dysuria


Musculoskeletal: Denies: back pain


Skin: Denies: rash


Neurological: Denies: weakness





<Andrew Palomo - Last Filed: 12/10/21 15:02>


ROS Other: All systems not noted in ROS Statement are negative.





<Andrea Smith - Last Filed: 12/10/21 17:24>


ROS Statement: 


Those systems with pertinent positive or pertinent negative responses have been 

documented in the HPI.








Past Medical History


Past Medical History: Pulmonary Embolus (PE), Thyroid Disorder


Additional Past Medical History / Comment(s): endometriosis, nonepileptic 

siezure disorder


History of Any Multi-Drug Resistant Organisms: None Reported


Past Surgical History: No Surgical Hx Reported


Additional Past Surgical History / Comment(s): Exploratory laparoscopy for 

endometriosis, radioactive iodine to thyroid 2


Past Psychological History: No Psychological Hx Reported


Smoking Status: Never smoker


Past Alcohol Use History: None Reported


Past Drug Use History: None Reported





- Past Family History


  ** Family history


Additional Family Medical History / Comment(s): Denies family history of blood 

clots, coronary artery disease, valvular heart disease, stroke





<Andrew Palomo - Last Filed: 12/10/21 15:02>





General Exam


Limitations: no limitations


General appearance: alert, other (Patient follows some commands and answers yes 

and no by shaking her head.  Patient does follow with her eyes.  Patient is 

having mild diffuse shaking.)


Head exam: Present: atraumatic


Eye exam: Present: normal appearance, PERRL


Neck exam: Present: normal inspection.  Absent: tenderness


Respiratory exam: Present: normal lung sounds bilaterally


Cardiovascular Exam: Present: regular rate, normal rhythm


GI/Abdominal exam: Present: soft.  Absent: tenderness


Extremities exam: Present: normal inspection


Neurological exam: Present: alert.  Absent: motor sensory deficit


Psychiatric exam: Present: other (Nonverbal at this time)


Skin exam: Present: normal color





<Andrew Palomo - Last Filed: 12/10/21 15:02>





Course





<Andrew Palomo - Last Filed: 12/10/21 15:02>


                                   Vital Signs











  12/10/21 12/10/21





  14:12 14:32


 


Temperature 98.3 F 


 


Pulse Rate 96 90


 


Respiratory 12 18





Rate  


 


Blood Pressure 143/82 130/81


 


O2 Sat by Pulse 97 100





Oximetry  














- Reevaluation(s)


Reevaluation #1: 





12/10/21 15:03


Patient reevaluated and unchanged.  Family updated.  He states symptoms have 

been intermittent over the past over hour now.  Patient did have similar episode

 on Monday.  Episode prior to that was a couple weeks earlier.  Patient will be 

given further Ativan, case endorsed to Dr. Melendez.  Review of chart reveals 

patient has had previous admission with neurology evaluation and computed 

tomography scan. (Andrew Palomo)





Medical Decision Making





- Lab Data


Result diagrams: 


                                 12/10/21 14:26





                                 12/10/21 14:26





<Andrew Palomo - Last Filed: 12/10/21 15:02>





- Lab Data


Result diagrams: 


                                 12/10/21 14:26





                                 12/10/21 14:26





<Andrea Smith - Last Filed: 12/10/21 17:24>





- Medical Decision Making


Patient is signed out to me by previous shift physician, Dr. Palomo.  Patient is 

a 26-year-old female presents today for involuntary tonic-clonic activity.  She 

was evaluated in April for seizures patient a normal EEG and a normal MRI.  She 

was discharged.  Patient and family member at bedside reports that the were not 

provided with a referral.   at the bedside reports that he spoke with 

colleagues and has an appointment with [facility] specialist in 

February.  Having frequent episodes since April of this year.  Began having 

increased frequency and intensity of these episodes again especially over the 

last couple weeks.  Patient has normal labs.  Patient evaluated the bedside 

still having these active symptoms.  Case is discussed with our neurologist who 

is agreeable that it is reasonable for patient to be admitted to the hospital 

for neurology evaluation.  Dr. Beltran of neurology request the patient be loaded 

with Keppra.  Patient be admitted to [facility] physician group.


 (Andrea Smith)





- Lab Data


                                   Lab Results











  12/10/21 12/10/21 12/10/21 Range/Units





  14:26 14:26 14:26 


 


WBC  6.3    (3.8-10.6)  k/uL


 


RBC  4.92    (3.80-5.40)  m/uL


 


Hgb  14.8    (11.4-16.0)  gm/dL


 


Hct  44.4    (34.0-46.0)  %


 


MCV  90.2    (80.0-100.0)  fL


 


MCH  30.0    (25.0-35.0)  pg


 


MCHC  33.3    (31.0-37.0)  g/dL


 


RDW  12.1    (11.5-15.5)  %


 


Plt Count  235    (150-450)  k/uL


 


MPV  8.3    


 


Neutrophils %  53    %


 


Lymphocytes %  37    %


 


Monocytes %  6    %


 


Eosinophils %  2    %


 


Basophils %  0    %


 


Neutrophils #  3.3    (1.3-7.7)  k/uL


 


Lymphocytes #  2.4    (1.0-4.8)  k/uL


 


Monocytes #  0.4    (0-1.0)  k/uL


 


Eosinophils #  0.1    (0-0.7)  k/uL


 


Basophils #  0.0    (0-0.2)  k/uL


 


Sodium    140  (137-145)  mmol/L


 


Potassium    4.4  (3.5-5.1)  mmol/L


 


Chloride    106  ()  mmol/L


 


Carbon Dioxide    23  (22-30)  mmol/L


 


Anion Gap    11  mmol/L


 


BUN    13  (7-17)  mg/dL


 


Creatinine    0.74  (0.52-1.04)  mg/dL


 


Est GFR (CKD-EPI)AfAm    >90  (>60 ml/min/1.73 sqM)  


 


Est GFR (CKD-EPI)NonAf    >90  (>60 ml/min/1.73 sqM)  


 


Glucose    88  (74-99)  mg/dL


 


Calcium    9.6  (8.4-10.2)  mg/dL


 


Magnesium     (1.6-2.3)  mg/dL


 


Total Bilirubin    0.4  (0.2-1.3)  mg/dL


 


AST    29  (14-36)  U/L


 


ALT    27  (4-34)  U/L


 


Alkaline Phosphatase    101  ()  U/L


 


Total Protein    8.3 H  (6.3-8.2)  g/dL


 


Albumin    5.0  (3.5-5.0)  g/dL


 


TSH    <0.015 L  (0.465-4.680)  mIU/L


 


Free T4    2.31 H  (0.78-2.19)  ng/dL


 


Free T3 pg/mL    4.3  (2.8-5.3)  pg/ml


 


Urine HCG, Qual     (Not Detectd)  


 


Urine Opiates Screen   Not Detected   (NotDetected)  


 


Ur Oxycodone Screen   Not Detected   (NotDetected)  


 


Urine Methadone Screen   Not Detected   (NotDetected)  


 


Ur Propoxyphene Screen   Not Detected   (NotDetected)  


 


Ur Barbiturates Screen   Not Detected   (NotDetected)  


 


U Tricyclic Antidepress   Not Detected   (NotDetected)  


 


Ur Phencyclidine Scrn   Not Detected   (NotDetected)  


 


Ur Amphetamines Screen   Not Detected   (NotDetected)  


 


U Methamphetamines Scrn   Not Detected   (NotDetected)  


 


U Benzodiazepines Scrn   Detected H   (NotDetected)  


 


Urine Cocaine Screen   Not Detected   (NotDetected)  


 


U Marijuana (THC) Screen   Not Detected   (NotDetected)  


 


Serum Alcohol    <10  mg/dL














  12/10/21 12/10/21 Range/Units





  14:26 16:06 


 


WBC    (3.8-10.6)  k/uL


 


RBC    (3.80-5.40)  m/uL


 


Hgb    (11.4-16.0)  gm/dL


 


Hct    (34.0-46.0)  %


 


MCV    (80.0-100.0)  fL


 


MCH    (25.0-35.0)  pg


 


MCHC    (31.0-37.0)  g/dL


 


RDW    (11.5-15.5)  %


 


Plt Count    (150-450)  k/uL


 


MPV    


 


Neutrophils %    %


 


Lymphocytes %    %


 


Monocytes %    %


 


Eosinophils %    %


 


Basophils %    %


 


Neutrophils #    (1.3-7.7)  k/uL


 


Lymphocytes #    (1.0-4.8)  k/uL


 


Monocytes #    (0-1.0)  k/uL


 


Eosinophils #    (0-0.7)  k/uL


 


Basophils #    (0-0.2)  k/uL


 


Sodium    (137-145)  mmol/L


 


Potassium    (3.5-5.1)  mmol/L


 


Chloride    ()  mmol/L


 


Carbon Dioxide    (22-30)  mmol/L


 


Anion Gap    mmol/L


 


BUN    (7-17)  mg/dL


 


Creatinine    (0.52-1.04)  mg/dL


 


Est GFR (CKD-EPI)AfAm    (>60 ml/min/1.73 sqM)  


 


Est GFR (CKD-EPI)NonAf    (>60 ml/min/1.73 sqM)  


 


Glucose    (74-99)  mg/dL


 


Calcium    (8.4-10.2)  mg/dL


 


Magnesium  2.0   (1.6-2.3)  mg/dL


 


Total Bilirubin    (0.2-1.3)  mg/dL


 


AST    (14-36)  U/L


 


ALT    (4-34)  U/L


 


Alkaline Phosphatase    ()  U/L


 


Total Protein    (6.3-8.2)  g/dL


 


Albumin    (3.5-5.0)  g/dL


 


TSH    (0.465-4.680)  mIU/L


 


Free T4    (0.78-2.19)  ng/dL


 


Free T3 pg/mL    (2.8-5.3)  pg/ml


 


Urine HCG, Qual   Not Detected  (Not Detectd)  


 


Urine Opiates Screen    (NotDetected)  


 


Ur Oxycodone Screen    (NotDetected)  


 


Urine Methadone Screen    (NotDetected)  


 


Ur Propoxyphene Screen    (NotDetected)  


 


Ur Barbiturates Screen    (NotDetected)  


 


U Tricyclic Antidepress    (NotDetected)  


 


Ur Phencyclidine Scrn    (NotDetected)  


 


Ur Amphetamines Screen    (NotDetected)  


 


U Methamphetamines Scrn    (NotDetected)  


 


U Benzodiazepines Scrn    (NotDetected)  


 


Urine Cocaine Screen    (NotDetected)  


 


U Marijuana (THC) Screen    (NotDetected)  


 


Serum Alcohol    mg/dL














Disposition





<Andrew Palomo - Last Filed: 12/10/21 15:02>





<Andrea Smith - Last Filed: 12/10/21 17:24>


Clinical Impression: 


 Tonic clonic convulsion





Disposition: ADMITTED AS IP TO THIS HOSP

## 2021-12-10 NOTE — P.HPIM
History of Present Illness


Chief Complaint: Involuntary movements





This is a very pleasant 26-year-old female no significant past medical history, 

and formulation of involuntary movements in her right wrist and left leg.  This 

started a few days ago.  No particular pattern to it.  Sunday she feels that her

whole body is numb and she cannot move all but she is conscious.  There is no 

loss of consciousness.  There is no headache vision changes nausea vomiting 

focal deficits or numbness or back pain.  Patient was evaluated for the similar 

situation 8 months ago.  She states that after discharge from the hospital she 

was feeling much better did not have any of these movements and she attributed 

to distal distress.  She did not have any follow-up after that.  However this 

started again 2 days ago.  Patient denies any recent stress.  Denies any 

drinking medications or drug use.  She is accompanied by her .





Review of Systems


All systems: negative





Past Medical History


Past Medical History: Pulmonary Embolus (PE), Thyroid Disorder


Additional Past Medical History / Comment(s): endometriosis, nonepileptic 

siezure disorder


History of Any Multi-Drug Resistant Organisms: None Reported


Past Surgical History: No Surgical Hx Reported


Additional Past Surgical History / Comment(s): Exploratory laparoscopy for 

endometriosis, radioactive iodine to thyroid 2


Past Psychological History: No Psychological Hx Reported


Smoking Status: Never smoker


Past Alcohol Use History: None Reported


Past Drug Use History: None Reported





- Past Family History


  ** Family history


Additional Family Medical History / Comment(s): Denies family history of blood 

clots, coronary artery disease, valvular heart disease, stroke





Medications and Allergies


                                Home Medications











 Medication  Instructions  Recorded  Confirmed  Type


 


Levothyroxine Sodium [Synthroid] 175 mcg PO DAILY 04/05/21 12/10/21 History


 


Escitalopram [Lexapro] 10 mg PO HS 12/10/21 12/10/21 History








                                    Allergies











Allergy/AdvReac Type Severity Reaction Status Date / Time


 


No Known Allergies Allergy   Verified 12/10/21 14:32














Physical Exam


Vitals: 


                                   Vital Signs











  Temp Pulse Resp BP Pulse Ox


 


 12/10/21 14:32   90  18  130/81  100


 


 12/10/21 14:12  98.3 F  96  12  143/82  97








                                Intake and Output











 12/10/21 12/10/21 12/10/21





 06:59 14:59 22:59


 


Other:   


 


  Weight  58.967 kg 














- Constitutional


General appearance: cooperative, no acute distress





- EENT


Eyes: EOMI, PERRLA, normal appearance


ENT: normal oropharynx





- Neck


Neck: no lymphadenopathy, normal ROM, no rigidity, no thyromegaly





- Respiratory


Respiratory: bilateral: CTA, negative: rales, rhonchi, wheezing





- Cardiovascular


Rhythm: regular


Heart sounds: normal: S1, S2





- Gastrointestinal


General gastrointestinal: no hepatomegaly, normal bowel sounds, soft, no 

splenomegaly, no tenderness





- Integumentary


Integumentary: normal, normal turgor, no rash





- Neurologic


Neurologic: CNII-XII intact





- Musculoskeletal


Musculoskeletal: gait normal, no generalized weakness, strength equal 

bilaterally





- Psychiatric


Psychiatric: A&O x's 3, appropriate affect, intact judgment & insight





Results


CBC & Chem 7: 


                                 12/10/21 14:26





                                 12/10/21 14:26


Labs: 


                  Abnormal Lab Results - Last 24 Hours (Table)











  12/10/21 12/10/21 Range/Units





  14:26 14:26 


 


Total Protein   8.3 H  (6.3-8.2)  g/dL


 


TSH   <0.015 L  (0.465-4.680)  mIU/L


 


Free T4   2.31 H  (0.78-2.19)  ng/dL


 


U Benzodiazepines Scrn  Detected H   (NotDetected)  














Assessment and Plan


Plan: 





#Involuntary movements


Neurology consultation


Neurology started Keppra


Check CBC CMP, TSH





#Hypothyroidism


Patient's TSH is quite suppressed, possibility or overworked treatment with 

levothyroxine


Questionably if some of this symptoms stemming from this


Advised patient to decrease the dose of levothyroxine and recheck TSH in 4 

weeks.





Patient will be admitted under observation


Full code


 is surrogate decision making

## 2021-12-11 VITALS
TEMPERATURE: 98.2 F | HEART RATE: 76 BPM | RESPIRATION RATE: 16 BRPM | DIASTOLIC BLOOD PRESSURE: 67 MMHG | SYSTOLIC BLOOD PRESSURE: 102 MMHG

## 2021-12-11 NOTE — P.PN
Subjective





No new complaints.  No involuntary movements or night feeling fine this morning.





Objective





- Vital Signs


Vital signs: 


                                   Vital Signs











Temp  97.9 F   12/11/21 07:00


 


Pulse  109 H  12/11/21 07:00


 


Resp  18   12/11/21 07:00


 


BP  108/74   12/11/21 07:00


 


Pulse Ox  98   12/11/21 07:00








                                 Intake & Output











 12/10/21 12/11/21 12/11/21





 18:59 06:59 18:59


 


Intake Total  100 180


 


Balance  100 180


 


Weight 58.967 kg  


 


Intake:   


 


  Intake, IV Titration  100 





  Amount   


 


    Sodium Chloride 0.9% 1,  100 





    000 ml @ 20 mls/hr IV .   





    Q24H ABBIE Rx#:[id number]   


 


  Oral   180


 


Other:   


 


  Voiding Method  Toilet 


 


  # Voids  1 2














- Exam





Awake alert and oriented 3


Lungs with the patient isn't cardiovascular regular rhythm and rate


Abdomen soft nontender


Extremities no peripheral edema


Neurological no focal deficits or involuntary movements or tremor or asterixis





- Labs


CBC & Chem 7: 


                                 12/10/21 14:26





                                 12/10/21 14:26


Labs: 


                  Abnormal Lab Results - Last 24 Hours (Table)











  12/10/21 12/10/21 Range/Units





  14:26 14:26 


 


Total Protein   8.3 H  (6.3-8.2)  g/dL


 


TSH   <0.015 L  (0.465-4.680)  mIU/L


 


Free T4   2.31 H  (0.78-2.19)  ng/dL


 


U Benzodiazepines Scrn  Detected H   (NotDetected)  














Assessment and Plan


Plan: 





#Involuntary movements


Neurology consultation


Neurology started Keppra


Check CBC CMP, TSH





#Hypothyroidism


Patient's TSH is quite suppressed, possibility or overworked treatment with 

levothyroxine


Questionably if some of this symptoms stemming from this


Advised patient to decrease the dose of levothyroxine and recheck TSH in 4 

weeks.





Patient will be admitted under observation


Full code


 is surrogate decision making

## 2021-12-11 NOTE — P.DS
Providers


Date of admission: 


12/10/21 16:22





Attending physician: 


Brody Anne MD





Consults: 





                                        





12/10/21 16:00


Consult Physician Routine 


   Consulting Provider: Dung Beltran


   Consult Reason/Comments: involuntary tonic clonic activities


   Do you want consulting provider notified?: Already Contacted











Primary care physician: 


Stated None





Hospital Course: 





Reason for admission





Very pleasant 26-year-old female came to emergency department fibrillation of 

evaluated movements that involved her right arm and left leg.  Sensation of all 

body numbness and inability to move.  No loss of consciousness.  No bladder or 

urine incontinence.  





Hospital course





Patient was admitted and observed overnight.  Patient did not have any further 

involuntary movements leg described on admission.  Patient was evaluated by n

eurology and they recommended for patient to follow up on outpatient basis with 

epileptologist or movement disorder specialist.  No medications at this time.  

Patient was advised not to drive for 6 months, no swimming, no climbing ladders,

no climbing leather or operating heavy machinery.





Also patient's TSH was on the lower side than desired and I advised her to 

contact her primary care physician and discuss the dosing of her levothyroxine 

and certainly TSH checked in next 24 weeks.  Lately she does not have any 

features of hyperthyroidism.








Disposition: Home with family care.  Follow-up with primary care physician for 

referrals as above.











Plan - Discharge Summary


Discharge Rx Participant: No


New Discharge Prescriptions: 


Continue


   Levothyroxine Sodium [Synthroid] 175 mcg PO DAILY


   Escitalopram [Lexapro] 10 mg PO HS


Discharge Medication List





Levothyroxine Sodium [Synthroid] 175 mcg PO DAILY 04/05/21 [History]


Escitalopram [Lexapro] 10 mg PO HS 12/10/21 [History]








Follow up Appointment(s)/Referral(s): 


None,Stated [Primary Care Provider] - 1-2 days


Activity/Diet/Wound Care/Special Instructions: 


Recheck your thyroid levels (TSH) in 24 weeks.   primary care physician 

about levothyroxine dose.


Recommended to see movement disorder specialist or epileptologist outpatient 

basis


The recommendations from neurology, no driving for 6 months, no climbing 

ladders, no swimming, no bathing alone and no operating dangerous machinery


Discharge Disposition: HOME SELF-CARE

## 2021-12-11 NOTE — P.CNNES
History of Present Illness


Consult date: 12/11/21


Reason for Consult: tonic-clonic movements


History of Present Illness: 





The patient is a 26-year-old  female [facility] via [facility]medicine.


The patient reports that she has been having eye twitching and breath holding 

episodes.  She states that her jaw locks.  She notices twitching of her hands 

and arms.  Sometimes she gets a warning sensation such as a "metal taste" in her

mouth and clammy hands.  Other times she does not get a warning sensation.  

Patient states that these symptoms began approximately 8 months ago after she 

underwent a procedure to check for endometriosis, which turned out to be 

negative.  Patient apparently developed a PE, following this procedure.  The 

patient reports that these episodes are not occurring daily.  She does notice 

them to sometimes be associated with her migraine headaches which are severe, at

times lasting more than 24 hours.  She says that she is aware when she is having

these episodes.  She does sometimes feels that she will pass out.  She denies 

tongue biting, loss of bowel or bladder control.  She reports at times she is 

unable to speak during the episodes and feels as if she needs to go the 

bathroom, but is unable to tell someone.  The patient reports that she must be 

very careful when she is walking.  She says she often has to think about 

walking.  The patient is working from home, most often.  She says these episodes

are interfering with her ability to do her work.  The patient says she has had 

these episodes when she is out in public.  She reports that she seldom goes out 

alone and seldom drives.  The patient does report a history of neck pain.


The patient reports being worked up on her previous hospital admission, with an 

EEG.  It was reportedly negative for seizure activity.  Lexapro was started at 

that time for concerns regarding anxiety.





Past Medical History


Past Medical History: Pulmonary Embolus (PE), Thyroid Disorder


Additional Past Medical History / Comment(s): endometriosis, nonepileptic 

siezure disorder


History of Any Multi-Drug Resistant Organisms: None Reported


Past Surgical History: No Surgical Hx Reported


Additional Past Surgical History / Comment(s): Exploratory laparoscopy for 

endometriosis, radioactive iodine to thyroid 2


Past Psychological History: No Psychological Hx Reported


Smoking Status: Never smoker


Past Alcohol Use History: None Reported


Past Drug Use History: None Reported





- Past Family History


  ** Family history


Additional Family Medical History / Comment(s): Denies family history of blood 

clots, coronary artery disease, valvular heart disease, stroke





Medications and Allergies


                                Home Medications











 Medication  Instructions  Recorded  Confirmed  Type


 


Levothyroxine Sodium [Synthroid] 175 mcg PO DAILY 04/05/21 12/10/21 History


 


Escitalopram [Lexapro] 10 mg PO HS 12/10/21 12/10/21 History








                                    Allergies











Allergy/AdvReac Type Severity Reaction Status Date / Time


 


No Known Allergies Allergy   Verified 12/10/21 14:32














Physical Examination





- Vital Signs


Vital Signs: 


                                   Vital Signs











  Temp Pulse Pulse Resp BP BP Pulse Ox


 


 12/11/21 07:00  97.9 F   109 H  18   108/74  98


 


 12/11/21 02:00     20   


 


 12/11/21 01:10  98.5 F  74   14  101/66   98


 


 12/10/21 20:00     20   


 


 12/10/21 19:08  98.1 F  85   16  101/67   96


 


 12/10/21 18:17  98.4 F  90   18  108/72   98








                                Intake and Output











 12/10/21 12/11/21 12/11/21





 22:59 06:59 14:59


 


Intake Total 100  340


 


Balance 100  340


 


Intake:   


 


  Intake, IV Titration 100  160





  Amount   


 


    Sodium Chloride 0.9% 1, 100  160





    000 ml @ 20 mls/hr IV .   





    Q24H [facility] Rx#:[id number]   


 


  Oral   180


 


Other:   


 


  Voiding Method Toilet Toilet 


 


  # Voids 1  2


 


  Weight 58.967 kg  














Gen.: The patient is reclining in the bed.  She is well-nourished, well-

developed and in no acute distress.  The patient has a blanket on her bed, from 

home.


HEENT: Head is atraumatic, normocephalic.  Fundus not visualized.  There is no 

scleral icterus.  Mucous membranes are moist.


Neck: Supple without carotid bruits


Heart: Regular rate and rhythm


Lungs: Clear to auscultation


Extremities: Without edema


Neurological examination


Mental status: The patient is awake, alert and oriented 3.  Her speech is 

clear.  There is no dysarthria or aphasia.  The patient very clearly and calmly 

answers questions and reports her symptoms.


                     There are several episodes of eyelid fluttering, with eye 

closure and disruption of the task being performed.  The patient does not 

verbalize that she is aware of these episodes, when they occur.


Cranial nerves: Pupils are equal at 3 mm and reactive.  Visual fields are full 

to confrontation.  Extraocular movements are intact.  There is no nystagmus.  

Facial sensation is intact.  There is no facial asymmetry.  Hearing is grossly 

intact.  Uvula and palate are midline.  Shoulder shrug is symmetric.  Tongue pro

trudes midline.  There is no evidence of tongue bite.


Motor: Strength is 5/5 throughout.  There are episodes of tremoring of the hands

 and the feet.


Coordination: Finger to nose, rapid alternating movements and heel-to-shin 

testing is intact.


Sensation: Grossly intact to light touch throughout


Deep tendon reflexes: 2+/4+ in the bilateral upper extremities.  3+/4+ at the 

knees.  Plantar responses are flexor bilaterally.


Gait: Not assessed





Results





- Laboratory Findings


CBC and BMP: 


                                 12/10/21 14:26





                                 12/10/21 14:26


Abnormal Lab Findings: 


                                  Abnormal Labs











  12/10/21 12/10/21





  14:26 14:26


 


Total Protein   8.3 H


 


TSH   <0.015 L


 


Free T4   2.31 H


 


U Benzodiazepines Scrn  Detected H 














Assessment and Plan


Assessment: 





1.  Non-epileptiform movements, consisting of episodes of eye fluttering, with 

closing of the eyes, mouth and jaw movements, bilateral hand tremoring and 

bilateral foot tremoring


These episodes are observed multiple times during the neurological examination. 

 The patient suddenly begins fluttering her eyes and then closes them.  She just

 as quickly opens them up again and continues to follow instructions or answer 

question       that was asked.  There is no postictal state.  Each episode lasts

 for only a few seconds.  Many of them are triggered by movements, such as 

checking strength or coordination.





Plan: 





1.  I would recommend the patient be seen by an epileptologist or a movement 

disorder specialist for further evaluation.  Would consider video monitored EEG


2.  I advised the patient of these recommendations.


3.  I advised against driving, swimming alone, climbing ladders, use of power 

tools, bathing while alone in the house and doing anything that may put her at 

risk of injury or death if she is to have one of these episodes.


Time with Patient: Greater than 30 (spent 45 minutes with patient via 

telemedicine.)

## 2024-04-22 ENCOUNTER — HOSPITAL ENCOUNTER (OUTPATIENT)
Dept: HOSPITAL 47 - LABWHC1 | Age: 29
Discharge: HOME | End: 2024-04-22
Attending: INTERNAL MEDICINE
Payer: COMMERCIAL

## 2024-04-22 DIAGNOSIS — Z3A.00: ICD-10-CM

## 2024-04-22 DIAGNOSIS — Z34.90: Primary | ICD-10-CM

## 2024-04-22 PROCEDURE — 84439 ASSAY OF FREE THYROXINE: CPT

## 2024-04-22 PROCEDURE — 84443 ASSAY THYROID STIM HORMONE: CPT

## 2024-04-22 PROCEDURE — 36415 COLL VENOUS BLD VENIPUNCTURE: CPT

## 2024-04-23 LAB — T4 FREE SERPL-MCNC: 1.09 NG/DL (ref 0.8–1.8)

## 2024-07-27 ENCOUNTER — HOSPITAL ENCOUNTER (OUTPATIENT)
Dept: HOSPITAL 47 - FBPOP | Age: 29
Discharge: HOME | End: 2024-07-27
Attending: OBSTETRICS & GYNECOLOGY
Payer: COMMERCIAL

## 2024-07-27 PROCEDURE — 59025 FETAL NON-STRESS TEST: CPT

## 2024-07-27 PROCEDURE — 99213 OFFICE O/P EST LOW 20 MIN: CPT

## 2024-07-28 VITALS
TEMPERATURE: 96.4 F | DIASTOLIC BLOOD PRESSURE: 74 MMHG | SYSTOLIC BLOOD PRESSURE: 129 MMHG | HEART RATE: 83 BPM | RESPIRATION RATE: 16 BRPM